# Patient Record
Sex: FEMALE | Race: ASIAN | NOT HISPANIC OR LATINO | Employment: FULL TIME
[De-identification: names, ages, dates, MRNs, and addresses within clinical notes are randomized per-mention and may not be internally consistent; named-entity substitution may affect disease eponyms.]

---

## 2018-02-04 ENCOUNTER — HEALTH MAINTENANCE LETTER (OUTPATIENT)
Age: 38
End: 2018-02-04

## 2019-11-08 ENCOUNTER — HEALTH MAINTENANCE LETTER (OUTPATIENT)
Age: 39
End: 2019-11-08

## 2020-02-23 ENCOUNTER — HEALTH MAINTENANCE LETTER (OUTPATIENT)
Age: 40
End: 2020-02-23

## 2020-12-06 ENCOUNTER — HEALTH MAINTENANCE LETTER (OUTPATIENT)
Age: 40
End: 2020-12-06

## 2021-01-22 ENCOUNTER — TRANSFERRED RECORDS (OUTPATIENT)
Dept: HEALTH INFORMATION MANAGEMENT | Facility: CLINIC | Age: 41
End: 2021-01-22
Payer: COMMERCIAL

## 2021-01-22 LAB
HPV ABSTRACT: NORMAL
PAP-ABSTRACT: NORMAL

## 2021-07-21 ENCOUNTER — ANCILLARY PROCEDURE (OUTPATIENT)
Dept: MAMMOGRAPHY | Facility: CLINIC | Age: 41
End: 2021-07-21
Payer: COMMERCIAL

## 2021-07-21 DIAGNOSIS — Z12.31 VISIT FOR SCREENING MAMMOGRAM: ICD-10-CM

## 2021-07-21 PROCEDURE — 77063 BREAST TOMOSYNTHESIS BI: CPT | Mod: TC | Performed by: RADIOLOGY

## 2021-07-21 PROCEDURE — 77067 SCR MAMMO BI INCL CAD: CPT | Mod: TC | Performed by: RADIOLOGY

## 2021-09-25 ENCOUNTER — HEALTH MAINTENANCE LETTER (OUTPATIENT)
Age: 41
End: 2021-09-25

## 2021-10-28 DIAGNOSIS — Z11.59 ENCOUNTER FOR SCREENING FOR OTHER VIRAL DISEASES: ICD-10-CM

## 2021-11-05 ENCOUNTER — MEDICAL CORRESPONDENCE (OUTPATIENT)
Dept: HEALTH INFORMATION MANAGEMENT | Facility: CLINIC | Age: 41
End: 2021-11-05
Payer: COMMERCIAL

## 2021-12-07 ENCOUNTER — LAB (OUTPATIENT)
Dept: URGENT CARE | Facility: URGENT CARE | Age: 41
End: 2021-12-07
Attending: OBSTETRICS & GYNECOLOGY
Payer: COMMERCIAL

## 2021-12-07 DIAGNOSIS — Z11.59 ENCOUNTER FOR SCREENING FOR OTHER VIRAL DISEASES: ICD-10-CM

## 2021-12-07 LAB — SARS-COV-2 RNA RESP QL NAA+PROBE: POSITIVE

## 2021-12-07 PROCEDURE — U0003 INFECTIOUS AGENT DETECTION BY NUCLEIC ACID (DNA OR RNA); SEVERE ACUTE RESPIRATORY SYNDROME CORONAVIRUS 2 (SARS-COV-2) (CORONAVIRUS DISEASE [COVID-19]), AMPLIFIED PROBE TECHNIQUE, MAKING USE OF HIGH THROUGHPUT TECHNOLOGIES AS DESCRIBED BY CMS-2020-01-R: HCPCS

## 2021-12-07 PROCEDURE — U0005 INFEC AGEN DETEC AMPLI PROBE: HCPCS

## 2021-12-08 ENCOUNTER — TELEPHONE (OUTPATIENT)
Dept: URGENT CARE | Facility: URGENT CARE | Age: 41
End: 2021-12-08
Payer: COMMERCIAL

## 2021-12-08 NOTE — TELEPHONE ENCOUNTER
"PRE-SURGERY      Coronavirus (COVID-19) Notification    Caller Name (Patient, parent, daughter/son, grandparent, etc)  patient    Reason for call  Notify of Positive Coronavirus (COVID-19) lab results, assess symptoms,  review [facility] recommendations    Lab Result    Lab test:  2019-nCoV rRt-PCR or SARS-CoV-2 PCR    Oropharyngeal AND/OR nasopharyngeal swabs is POSITIVE for 2019-nCoV RNA/SARS-COV-2 PCR (COVID-19 virus)    RN Recommendations/Instructions per [facility] Coronavirus COVID-19 recommendations    Brief introduction script  Introduce self then review script:  \"I am calling on behalf of [employer].  We were notified that your Coronavirus test (COVID-19) for was POSITIVE for the virus.  I have some information to relay to you but first I wanted to mention that the [place] Dept of Health will be contacting you shortly [it's possible MD already called Patient] to talk to you more about how you are feeling and other people you have had contact with who might now also have the virus.  Also, [facility] is Partnering with the [facility] for Covid-19 research, you may be contacted directly by research staff.\"    Patient reports she is fully vaccinated for Covid with Pfizer including the Booster.    Assessment (Inquire about Patient's current symptoms)   Assessment   Current Symptoms at time of phone call: (if no symptoms, document No symptoms] Nasal congestion, runny nose   Symptoms onset (if applicable) 12/4/2021     If at time of call, Patients symptoms hare worsened, the Patient should contact 911 or have someone drive them to Emergency Dept promptly:      If Patient calling 911, inform 911 personal that you have tested positive for the Coronavirus (COVID-19).  Place mask on and await 911 to arrive.    If Emergency Dept, If possible, please have another adult drive you to the Emergency Dept but you need to wear mask when in contact with other people.      Monoclonal Antibody " "Administration    You may be eligible to receive a new treatment with a monoclonal antibody for preventing hospitalization in patients at high risk for complications from COVID-19.   This medication is still experimental and available on a limited basis; it is given through an IV and must be given at an infusion center. Please note that not all people who are eligible will receive the medication since it is in limited supply.     Are you interested in being considered for this medication?  No.   Does the patient fit the criteria: Patient declined?      If patient qualifies based on above criteria:  \"You will be contacted if you are selected to receive this treatment in the next 1-2 business days.   This is time sensitive and if you are not selected in the next 1-2 business days, you will not receive the medication.  If you do not receive a call to schedule, you have not been selected.\"      Review information with Patient    Your result was positive. This means you have COVID-19 (coronavirus).  We have sent you a letter that reviews the information that I'll be reviewing with you now.    How can I protect others?    If you have symptoms: stay home and away from others (self-isolate) until:    You've had no fever--and no medicine that reduces fever--for 1 full day (24 hours). And       Your other symptoms have gotten better. For example, your cough or breathing has improved. And     At least 10 days have passed since your symptoms started. (If you've been told by a doctor that you have a weak immune system, wait 20 days.)     If you don't have symptoms: Stay home and away from others (self-isolate) until at least 10 days have passed since your first positive COVID-19 test. (Date test collected)    During this time:    Stay in your own room, including for meals. Use your own bathroom if you can.    Stay away from others in your home. No hugging, kissing or shaking hands. No visitors.     Don't go to work, school or " anywhere else.     Clean  high touch  surfaces often (doorknobs, counters, handles, etc.). Use a household cleaning spray or wipes. You'll find a full list on the EPA website at www.epa.gov/pesticide-registration/list-n-disinfectants-use-against-sars-cov-2.     Cover your mouth and nose with a mask, tissue or other face covering to avoid spreading germs.    Wash your hands and face often with soap and water.    Make a list of people you have been in close contact with recently, even if either of you wore a face covering.   ; Start your list from 2 days before you became ill or had a positive test.  ; Include anyone that was within 6 feet of you for a cumulative total of 15 minutes or more in 24 hours. (Example: if you sat next to Bryn for 5 minutes in the morning and 10 minutes in the afternoon, then you were in close contact for 15 minutes total that day. Bryn would be added to your list.)    A public health worker will call or text you. It is important that you answer. They will ask you questions about possible exposures to COVID-19, such as people you have been in direct contact with and places you have visited.    Tell the people on your list that you have COVID-19; they should stay away from others for 14 days starting from the last time they were in contact with you (unless you are told something different from a public health worker).     Caregivers in these groups are at risk for severe illness due to COVID-19:  o People 65 years and older  o People who live in a nursing home or long-term care facility  o People with chronic disease (lung, heart, cancer, diabetes, kidney, liver, immunologic)  o People who have a weakened immune system, including those who:  - Are in cancer treatment  - Take medicine that weakens the immune system, such as corticosteroids  - Had a bone marrow or organ transplant  - Have an immune deficiency  - Have poorly controlled HIV or AIDS  - Are obese (body mass index of 40 or  higher)  - Smoke regularly    Caregivers should wear gloves while washing dishes, handling laundry and cleaning bedrooms and bathrooms.    Wash and dry laundry with special caution. Don't shake dirty laundry, and use the warmest water setting you can.    If you have a weakened immune system, ask your doctor about other actions you should take.    For more tips, go to www.cdc.gov/coronavirus/2019-ncov/downloads/10Things.pdf.    You should not go back to work until you meet the guidelines above for ending your home isolation. You don't need to be retested for COVID-19 before going back to work--studies show that you won't spread the virus if it's been at least 10 days since your symptoms started (or 20 days, if you have a weak immune system).    Employers: This document serves as formal notice of your employee's medical guidelines for going back to work. They must meet the above guidelines before going back to work in person.    How can I take care of myself?    1. Get lots of rest. Drink extra fluids (unless a doctor has told you not to).    2. Take Tylenol (acetaminophen) for fever or pain. If you have liver or kidney problems, ask your family doctor if it's okay to take Tylenol.     Take either:     650 mg (two 325 mg pills) every 4 to 6 hours, or     1,000 mg (two 500 mg pills) every 8 hours as needed.     Note: Don't take more than 3,000 mg in one day. Acetaminophen is found in many medicines (both prescribed and over-the-counter medicines). Read all labels to be sure you don't take too much.    For children, check the Tylenol bottle for the right dose (based on their age or weight).    3. If you have other health problems (like cancer, heart failure, an organ transplant or severe kidney disease): Call your specialty clinic if you don't feel better in the next 2 days.    4. Know when to call 911: Emergency warning signs include:    Trouble breathing or shortness of breath    Pain or pressure in the chest that  doesn't go away    Feeling confused like you haven't felt before, or not being able to wake up    Bluish-colored lips or face    5. Sign up for [employer]. We know it's scary to hear that you have COVID-19. We want to track your symptoms to make sure you're okay over the next 2 weeks. Please look for an email from [employer]--this is a free, online program that we'll use to keep in touch. To sign up, follow the link in the email. Learn more at [email or web address].    Where can I get more information?    [facility]: [email or web address]    Coronavirus Basics: www.health.[facility].[place]/diseases/coronavirus/basics.html    What to Do If You're Sick: www.cdc.gov/coronavirus/2019-ncov/about/steps-when-sick.html    Ending Home Isolation: www.cdc.gov/coronavirus/2019-ncov/hcp/disposition-in-home-patients.html     Caring for Someone with COVID-19: www.cdc.gov/coronavirus/2019-ncov/if-you-are-sick/care-for-someone.html     [facility] clinical trials (COVID-19 research studies): [email or web address]     A Positive COVID-19 letter will be sent via Stripe or the mail. (Exception, no letters sent to Presurgerical/Preprocedure Patients)    Anisa Cardenas LPN

## 2022-01-05 RX ORDER — ACETAMINOPHEN 325 MG/1
325-650 TABLET ORAL DAILY PRN
COMMUNITY
End: 2022-06-06

## 2022-01-05 RX ORDER — FERROUS SULFATE 325(65) MG
325 TABLET ORAL
Status: ON HOLD | COMMUNITY
End: 2022-07-14

## 2022-01-05 RX ORDER — FLUTICASONE PROPIONATE 50 MCG
1 SPRAY, SUSPENSION (ML) NASAL DAILY PRN
COMMUNITY
End: 2022-06-06

## 2022-01-05 RX ORDER — ZINC OXIDE 13 %
1 CREAM (GRAM) TOPICAL EVERY EVENING
COMMUNITY
End: 2022-06-06

## 2022-01-05 NOTE — PROGRESS NOTES
PTA medications updated by Medication Scribe prior to surgery via phone call with patient (last doses completed by Nurse)     Medication history sources: Patient, Surescripts and H&P  In the past week, patient estimated taking medication this percent of the time: Greater than 90%  Adherence assessment: N/A Not Observed    Significant changes made to the medication list:  None      Additional medication history information:   None    Medication reconciliation completed by provider prior to medication history? No    Time spent in this activity: 25 MINUTES    The information provided in this note is only as accurate as the sources available at the time of update(s)      Prior to Admission medications    Medication Sig Last Dose Taking? Auth Provider   acetaminophen (TYLENOL) 325 MG tablet Take 325-650 mg by mouth daily as needed for mild pain  at PRN Yes Reported, Patient   ferrous sulfate (FEROSUL) 325 (65 Fe) MG tablet Take 325 mg by mouth daily (with breakfast)  at AM Yes Reported, Patient   fluticasone (FLONASE) 50 MCG/ACT nasal spray Spray 1 spray into both nostrils daily as needed for rhinitis or allergies  at PRN Yes Reported, Patient   leuprolide (LUPRON DEPOT) 3.75 MG kit Inject 3.75 mg into the muscle every 3 months  at PRN Yes Reported, Patient   Multiple Vitamin (MULTI-VITAMIN) per tablet Take 1 tablet by mouth daily.  at PM Yes Livia Montiel MD   Probiotic Product (PROBIOTIC DAILY) CAPS Take 1 capsule by mouth every evening  at PM Yes Reported, Patient

## 2022-01-06 ENCOUNTER — HOSPITAL ENCOUNTER (INPATIENT)
Facility: CLINIC | Age: 42
LOS: 1 days | Discharge: HOME | DRG: 750 | End: 2022-01-07
Attending: OBSTETRICS & GYNECOLOGY | Admitting: OBSTETRICS & GYNECOLOGY
Payer: COMMERCIAL

## 2022-01-06 ENCOUNTER — ANESTHESIA (OUTPATIENT)
Dept: SURGERY | Facility: CLINIC | Age: 42
DRG: 750 | End: 2022-01-06
Payer: COMMERCIAL

## 2022-01-06 ENCOUNTER — ANESTHESIA EVENT (OUTPATIENT)
Dept: SURGERY | Facility: CLINIC | Age: 42
DRG: 750 | End: 2022-01-06
Payer: COMMERCIAL

## 2022-01-06 DIAGNOSIS — Z98.890 S/P LAPAROTOMY: Primary | ICD-10-CM

## 2022-01-06 LAB
B-HCG SERPL-ACNC: <1 IU/L (ref 0–5)
HGB BLD-MCNC: 13.1 G/DL (ref 11.7–15.7)

## 2022-01-06 PROCEDURE — 258N000003 HC RX IP 258 OP 636: Performed by: NURSE ANESTHETIST, CERTIFIED REGISTERED

## 2022-01-06 PROCEDURE — 250N000011 HC RX IP 250 OP 636: Performed by: ANESTHESIOLOGY

## 2022-01-06 PROCEDURE — 272N000001 HC OR GENERAL SUPPLY STERILE: Performed by: OBSTETRICS & GYNECOLOGY

## 2022-01-06 PROCEDURE — 250N000011 HC RX IP 250 OP 636: Performed by: PHYSICIAN ASSISTANT

## 2022-01-06 PROCEDURE — 120N000001 HC R&B MED SURG/OB

## 2022-01-06 PROCEDURE — 250N000013 HC RX MED GY IP 250 OP 250 PS 637: Performed by: PHYSICIAN ASSISTANT

## 2022-01-06 PROCEDURE — 250N000011 HC RX IP 250 OP 636: Performed by: NURSE ANESTHETIST, CERTIFIED REGISTERED

## 2022-01-06 PROCEDURE — 250N000009 HC RX 250: Performed by: NURSE ANESTHETIST, CERTIFIED REGISTERED

## 2022-01-06 PROCEDURE — 250N000011 HC RX IP 250 OP 636

## 2022-01-06 PROCEDURE — 710N000009 HC RECOVERY PHASE 1, LEVEL 1, PER MIN: Performed by: OBSTETRICS & GYNECOLOGY

## 2022-01-06 PROCEDURE — 360N000076 HC SURGERY LEVEL 3, PER MIN: Performed by: OBSTETRICS & GYNECOLOGY

## 2022-01-06 PROCEDURE — 258N000003 HC RX IP 258 OP 636

## 2022-01-06 PROCEDURE — 85018 HEMOGLOBIN: CPT | Performed by: PHYSICIAN ASSISTANT

## 2022-01-06 PROCEDURE — 258N000003 HC RX IP 258 OP 636: Performed by: ANESTHESIOLOGY

## 2022-01-06 PROCEDURE — 258N000003 HC RX IP 258 OP 636: Performed by: OBSTETRICS & GYNECOLOGY

## 2022-01-06 PROCEDURE — 999N000141 HC STATISTIC PRE-PROCEDURE NURSING ASSESSMENT: Performed by: OBSTETRICS & GYNECOLOGY

## 2022-01-06 PROCEDURE — C1765 ADHESION BARRIER: HCPCS | Performed by: OBSTETRICS & GYNECOLOGY

## 2022-01-06 PROCEDURE — 370N000017 HC ANESTHESIA TECHNICAL FEE, PER MIN: Performed by: OBSTETRICS & GYNECOLOGY

## 2022-01-06 PROCEDURE — 84702 CHORIONIC GONADOTROPIN TEST: CPT | Performed by: PHYSICIAN ASSISTANT

## 2022-01-06 PROCEDURE — 0WJG0ZZ INSPECTION OF PERITONEAL CAVITY, OPEN APPROACH: ICD-10-PCS | Performed by: OBSTETRICS & GYNECOLOGY

## 2022-01-06 PROCEDURE — 250N000025 HC SEVOFLURANE, PER MIN: Performed by: OBSTETRICS & GYNECOLOGY

## 2022-01-06 PROCEDURE — 250N000013 HC RX MED GY IP 250 OP 250 PS 637: Performed by: OBSTETRICS & GYNECOLOGY

## 2022-01-06 PROCEDURE — 36415 COLL VENOUS BLD VENIPUNCTURE: CPT | Performed by: PHYSICIAN ASSISTANT

## 2022-01-06 PROCEDURE — 250N000009 HC RX 250

## 2022-01-06 PROCEDURE — 250N000011 HC RX IP 250 OP 636: Performed by: OBSTETRICS & GYNECOLOGY

## 2022-01-06 PROCEDURE — 250N000009 HC RX 250: Performed by: OBSTETRICS & GYNECOLOGY

## 2022-01-06 RX ORDER — HYDROMORPHONE HCL IN WATER/PF 6 MG/30 ML
0.4 PATIENT CONTROLLED ANALGESIA SYRINGE INTRAVENOUS
Status: DISCONTINUED | OUTPATIENT
Start: 2022-01-06 | End: 2022-01-07 | Stop reason: HOSPADM

## 2022-01-06 RX ORDER — BISACODYL 10 MG
10 SUPPOSITORY, RECTAL RECTAL DAILY PRN
Status: DISCONTINUED | OUTPATIENT
Start: 2022-01-06 | End: 2022-01-07 | Stop reason: HOSPADM

## 2022-01-06 RX ORDER — NALOXONE HYDROCHLORIDE 0.4 MG/ML
0.2 INJECTION, SOLUTION INTRAMUSCULAR; INTRAVENOUS; SUBCUTANEOUS
Status: DISCONTINUED | OUTPATIENT
Start: 2022-01-06 | End: 2022-01-07 | Stop reason: HOSPADM

## 2022-01-06 RX ORDER — NALOXONE HYDROCHLORIDE 0.4 MG/ML
0.4 INJECTION, SOLUTION INTRAMUSCULAR; INTRAVENOUS; SUBCUTANEOUS
Status: DISCONTINUED | OUTPATIENT
Start: 2022-01-06 | End: 2022-01-07 | Stop reason: HOSPADM

## 2022-01-06 RX ORDER — DIMENHYDRINATE 50 MG/ML
25 INJECTION, SOLUTION INTRAMUSCULAR; INTRAVENOUS
Status: DISCONTINUED | OUTPATIENT
Start: 2022-01-06 | End: 2022-01-06 | Stop reason: HOSPADM

## 2022-01-06 RX ORDER — PROPOFOL 10 MG/ML
INJECTION, EMULSION INTRAVENOUS PRN
Status: DISCONTINUED | OUTPATIENT
Start: 2022-01-06 | End: 2022-01-06

## 2022-01-06 RX ORDER — ONDANSETRON 4 MG/1
4 TABLET, ORALLY DISINTEGRATING ORAL EVERY 30 MIN PRN
Status: DISCONTINUED | OUTPATIENT
Start: 2022-01-06 | End: 2022-01-06 | Stop reason: HOSPADM

## 2022-01-06 RX ORDER — NEOSTIGMINE METHYLSULFATE 1 MG/ML
VIAL (ML) INJECTION PRN
Status: DISCONTINUED | OUTPATIENT
Start: 2022-01-06 | End: 2022-01-06

## 2022-01-06 RX ORDER — SODIUM CHLORIDE, SODIUM LACTATE, POTASSIUM CHLORIDE, CALCIUM CHLORIDE 600; 310; 30; 20 MG/100ML; MG/100ML; MG/100ML; MG/100ML
INJECTION, SOLUTION INTRAVENOUS CONTINUOUS
Status: DISCONTINUED | OUTPATIENT
Start: 2022-01-06 | End: 2022-01-06 | Stop reason: HOSPADM

## 2022-01-06 RX ORDER — HYDRALAZINE HYDROCHLORIDE 20 MG/ML
2.5-5 INJECTION INTRAMUSCULAR; INTRAVENOUS EVERY 10 MIN PRN
Status: DISCONTINUED | OUTPATIENT
Start: 2022-01-06 | End: 2022-01-06 | Stop reason: HOSPADM

## 2022-01-06 RX ORDER — OXYCODONE HYDROCHLORIDE 5 MG/1
5 TABLET ORAL EVERY 4 HOURS PRN
Status: DISCONTINUED | OUTPATIENT
Start: 2022-01-06 | End: 2022-01-06 | Stop reason: HOSPADM

## 2022-01-06 RX ORDER — FAMOTIDINE 20 MG/1
20 TABLET, FILM COATED ORAL 2 TIMES DAILY
Status: DISCONTINUED | OUTPATIENT
Start: 2022-01-06 | End: 2022-01-07 | Stop reason: HOSPADM

## 2022-01-06 RX ORDER — CALCIUM CARBONATE 500 MG/1
500 TABLET, CHEWABLE ORAL 4 TIMES DAILY PRN
Status: DISCONTINUED | OUTPATIENT
Start: 2022-01-06 | End: 2022-01-07 | Stop reason: HOSPADM

## 2022-01-06 RX ORDER — OXYCODONE HYDROCHLORIDE 5 MG/1
5 TABLET ORAL EVERY 4 HOURS PRN
Status: DISCONTINUED | OUTPATIENT
Start: 2022-01-06 | End: 2022-01-07 | Stop reason: HOSPADM

## 2022-01-06 RX ORDER — ONDANSETRON 2 MG/ML
4 INJECTION INTRAMUSCULAR; INTRAVENOUS EVERY 6 HOURS PRN
Status: DISCONTINUED | OUTPATIENT
Start: 2022-01-06 | End: 2022-01-07 | Stop reason: HOSPADM

## 2022-01-06 RX ORDER — LIDOCAINE 40 MG/G
CREAM TOPICAL
Status: DISCONTINUED | OUTPATIENT
Start: 2022-01-06 | End: 2022-01-07 | Stop reason: HOSPADM

## 2022-01-06 RX ORDER — ACETAMINOPHEN 325 MG/1
975 TABLET ORAL ONCE
Status: DISCONTINUED | OUTPATIENT
Start: 2022-01-06 | End: 2022-01-06 | Stop reason: HOSPADM

## 2022-01-06 RX ORDER — ACETAMINOPHEN 325 MG/1
975 TABLET ORAL EVERY 6 HOURS
Status: DISCONTINUED | OUTPATIENT
Start: 2022-01-06 | End: 2022-01-07 | Stop reason: HOSPADM

## 2022-01-06 RX ORDER — HYDROXYZINE HYDROCHLORIDE 25 MG/1
25 TABLET, FILM COATED ORAL EVERY 6 HOURS PRN
Status: DISCONTINUED | OUTPATIENT
Start: 2022-01-06 | End: 2022-01-07 | Stop reason: HOSPADM

## 2022-01-06 RX ORDER — AMOXICILLIN 250 MG
1 CAPSULE ORAL 2 TIMES DAILY
Status: DISCONTINUED | OUTPATIENT
Start: 2022-01-06 | End: 2022-01-07 | Stop reason: HOSPADM

## 2022-01-06 RX ORDER — CEFAZOLIN SODIUM 2 G/100ML
2 INJECTION, SOLUTION INTRAVENOUS
Status: COMPLETED | OUTPATIENT
Start: 2022-01-06 | End: 2022-01-06

## 2022-01-06 RX ORDER — ACETAMINOPHEN 325 MG/1
650 TABLET ORAL EVERY 6 HOURS
Status: DISCONTINUED | OUTPATIENT
Start: 2022-01-09 | End: 2022-01-07 | Stop reason: HOSPADM

## 2022-01-06 RX ORDER — FENTANYL CITRATE 50 UG/ML
INJECTION, SOLUTION INTRAMUSCULAR; INTRAVENOUS PRN
Status: DISCONTINUED | OUTPATIENT
Start: 2022-01-06 | End: 2022-01-06

## 2022-01-06 RX ORDER — ONDANSETRON 2 MG/ML
INJECTION INTRAMUSCULAR; INTRAVENOUS PRN
Status: DISCONTINUED | OUTPATIENT
Start: 2022-01-06 | End: 2022-01-06

## 2022-01-06 RX ORDER — SODIUM CHLORIDE, SODIUM LACTATE, POTASSIUM CHLORIDE, CALCIUM CHLORIDE 600; 310; 30; 20 MG/100ML; MG/100ML; MG/100ML; MG/100ML
INJECTION, SOLUTION INTRAVENOUS CONTINUOUS
Status: DISCONTINUED | OUTPATIENT
Start: 2022-01-06 | End: 2022-01-07 | Stop reason: HOSPADM

## 2022-01-06 RX ORDER — PROPOFOL 10 MG/ML
INJECTION, EMULSION INTRAVENOUS CONTINUOUS PRN
Status: DISCONTINUED | OUTPATIENT
Start: 2022-01-06 | End: 2022-01-06

## 2022-01-06 RX ORDER — KETOROLAC TROMETHAMINE 15 MG/ML
15 INJECTION, SOLUTION INTRAMUSCULAR; INTRAVENOUS EVERY 6 HOURS
Status: DISCONTINUED | OUTPATIENT
Start: 2022-01-06 | End: 2022-01-07 | Stop reason: HOSPADM

## 2022-01-06 RX ORDER — HYDROMORPHONE HCL IN WATER/PF 6 MG/30 ML
0.2 PATIENT CONTROLLED ANALGESIA SYRINGE INTRAVENOUS EVERY 5 MIN PRN
Status: DISCONTINUED | OUTPATIENT
Start: 2022-01-06 | End: 2022-01-06 | Stop reason: HOSPADM

## 2022-01-06 RX ORDER — ONDANSETRON 4 MG/1
4 TABLET, ORALLY DISINTEGRATING ORAL EVERY 6 HOURS PRN
Status: DISCONTINUED | OUTPATIENT
Start: 2022-01-06 | End: 2022-01-07 | Stop reason: HOSPADM

## 2022-01-06 RX ORDER — PROCHLORPERAZINE MALEATE 10 MG
10 TABLET ORAL EVERY 6 HOURS PRN
Status: DISCONTINUED | OUTPATIENT
Start: 2022-01-06 | End: 2022-01-07 | Stop reason: HOSPADM

## 2022-01-06 RX ORDER — KETOROLAC TROMETHAMINE 30 MG/ML
INJECTION, SOLUTION INTRAMUSCULAR; INTRAVENOUS PRN
Status: DISCONTINUED | OUTPATIENT
Start: 2022-01-06 | End: 2022-01-06

## 2022-01-06 RX ORDER — OXYCODONE HYDROCHLORIDE 5 MG/1
10 TABLET ORAL EVERY 4 HOURS PRN
Status: DISCONTINUED | OUTPATIENT
Start: 2022-01-06 | End: 2022-01-07 | Stop reason: HOSPADM

## 2022-01-06 RX ORDER — ACETAMINOPHEN 325 MG/1
975 TABLET ORAL ONCE
Status: COMPLETED | OUTPATIENT
Start: 2022-01-06 | End: 2022-01-06

## 2022-01-06 RX ORDER — HYDROXYZINE HYDROCHLORIDE 25 MG/1
25 TABLET, FILM COATED ORAL EVERY 6 HOURS PRN
Status: DISCONTINUED | OUTPATIENT
Start: 2022-01-06 | End: 2022-01-06 | Stop reason: HOSPADM

## 2022-01-06 RX ORDER — HYDROMORPHONE HCL IN WATER/PF 6 MG/30 ML
0.2 PATIENT CONTROLLED ANALGESIA SYRINGE INTRAVENOUS
Status: DISCONTINUED | OUTPATIENT
Start: 2022-01-06 | End: 2022-01-07 | Stop reason: HOSPADM

## 2022-01-06 RX ORDER — CEFAZOLIN SODIUM 2 G/100ML
2 INJECTION, SOLUTION INTRAVENOUS SEE ADMIN INSTRUCTIONS
Status: DISCONTINUED | OUTPATIENT
Start: 2022-01-06 | End: 2022-01-06 | Stop reason: HOSPADM

## 2022-01-06 RX ORDER — LABETALOL HYDROCHLORIDE 5 MG/ML
10 INJECTION, SOLUTION INTRAVENOUS
Status: DISCONTINUED | OUTPATIENT
Start: 2022-01-06 | End: 2022-01-06 | Stop reason: HOSPADM

## 2022-01-06 RX ORDER — IBUPROFEN 400 MG/1
800 TABLET, FILM COATED ORAL EVERY 6 HOURS
Status: DISCONTINUED | OUTPATIENT
Start: 2022-01-06 | End: 2022-01-07 | Stop reason: HOSPADM

## 2022-01-06 RX ORDER — GLYCOPYRROLATE 0.2 MG/ML
INJECTION, SOLUTION INTRAMUSCULAR; INTRAVENOUS PRN
Status: DISCONTINUED | OUTPATIENT
Start: 2022-01-06 | End: 2022-01-06

## 2022-01-06 RX ORDER — FENTANYL CITRATE 0.05 MG/ML
50 INJECTION, SOLUTION INTRAMUSCULAR; INTRAVENOUS EVERY 5 MIN PRN
Status: DISCONTINUED | OUTPATIENT
Start: 2022-01-06 | End: 2022-01-06 | Stop reason: HOSPADM

## 2022-01-06 RX ORDER — DEXAMETHASONE SODIUM PHOSPHATE 4 MG/ML
INJECTION, SOLUTION INTRA-ARTICULAR; INTRALESIONAL; INTRAMUSCULAR; INTRAVENOUS; SOFT TISSUE PRN
Status: DISCONTINUED | OUTPATIENT
Start: 2022-01-06 | End: 2022-01-06

## 2022-01-06 RX ORDER — LIDOCAINE HYDROCHLORIDE 20 MG/ML
INJECTION, SOLUTION INFILTRATION; PERINEURAL PRN
Status: DISCONTINUED | OUTPATIENT
Start: 2022-01-06 | End: 2022-01-06

## 2022-01-06 RX ORDER — ONDANSETRON 2 MG/ML
4 INJECTION INTRAMUSCULAR; INTRAVENOUS EVERY 30 MIN PRN
Status: DISCONTINUED | OUTPATIENT
Start: 2022-01-06 | End: 2022-01-06 | Stop reason: HOSPADM

## 2022-01-06 RX ORDER — MAGNESIUM HYDROXIDE 1200 MG/15ML
LIQUID ORAL PRN
Status: DISCONTINUED | OUTPATIENT
Start: 2022-01-06 | End: 2022-01-06 | Stop reason: HOSPADM

## 2022-01-06 RX ADMIN — DEXAMETHASONE SODIUM PHOSPHATE 4 MG: 4 INJECTION, SOLUTION INTRA-ARTICULAR; INTRALESIONAL; INTRAMUSCULAR; INTRAVENOUS; SOFT TISSUE at 13:50

## 2022-01-06 RX ADMIN — FAMOTIDINE 20 MG: 20 TABLET ORAL at 21:44

## 2022-01-06 RX ADMIN — GLYCOPYRROLATE 0.4 MG: 0.2 INJECTION, SOLUTION INTRAMUSCULAR; INTRAVENOUS at 15:06

## 2022-01-06 RX ADMIN — FENTANYL CITRATE 50 MCG: 50 INJECTION, SOLUTION INTRAMUSCULAR; INTRAVENOUS at 15:52

## 2022-01-06 RX ADMIN — PHENYLEPHRINE HYDROCHLORIDE 100 MCG: 10 INJECTION INTRAVENOUS at 15:14

## 2022-01-06 RX ADMIN — FENTANYL CITRATE 50 MCG: 50 INJECTION, SOLUTION INTRAMUSCULAR; INTRAVENOUS at 16:04

## 2022-01-06 RX ADMIN — PROPOFOL 200 MG: 10 INJECTION, EMULSION INTRAVENOUS at 13:50

## 2022-01-06 RX ADMIN — PHENYLEPHRINE HYDROCHLORIDE 100 MCG: 10 INJECTION INTRAVENOUS at 14:20

## 2022-01-06 RX ADMIN — HYDROMORPHONE HYDROCHLORIDE 0.4 MG: 0.2 INJECTION, SOLUTION INTRAMUSCULAR; INTRAVENOUS; SUBCUTANEOUS at 22:48

## 2022-01-06 RX ADMIN — FENTANYL CITRATE 50 MCG: 50 INJECTION, SOLUTION INTRAMUSCULAR; INTRAVENOUS at 13:50

## 2022-01-06 RX ADMIN — IBUPROFEN 800 MG: 400 TABLET, FILM COATED ORAL at 21:44

## 2022-01-06 RX ADMIN — PHENYLEPHRINE HYDROCHLORIDE 150 MCG: 10 INJECTION INTRAVENOUS at 14:49

## 2022-01-06 RX ADMIN — FENTANYL CITRATE 50 MCG: 50 INJECTION, SOLUTION INTRAMUSCULAR; INTRAVENOUS at 16:45

## 2022-01-06 RX ADMIN — MIDAZOLAM 2 MG: 1 INJECTION INTRAMUSCULAR; INTRAVENOUS at 13:45

## 2022-01-06 RX ADMIN — ACETAMINOPHEN 975 MG: 325 TABLET, FILM COATED ORAL at 18:14

## 2022-01-06 RX ADMIN — ACETAMINOPHEN 975 MG: 325 TABLET, FILM COATED ORAL at 11:40

## 2022-01-06 RX ADMIN — FENTANYL CITRATE 50 MCG: 50 INJECTION, SOLUTION INTRAMUSCULAR; INTRAVENOUS at 14:14

## 2022-01-06 RX ADMIN — HYDROMORPHONE HYDROCHLORIDE 0.4 MG: 0.2 INJECTION, SOLUTION INTRAMUSCULAR; INTRAVENOUS; SUBCUTANEOUS at 19:34

## 2022-01-06 RX ADMIN — ONDANSETRON 4 MG: 2 INJECTION INTRAMUSCULAR; INTRAVENOUS at 15:03

## 2022-01-06 RX ADMIN — DEXMEDETOMIDINE HYDROCHLORIDE 12 MCG: 100 INJECTION, SOLUTION INTRAVENOUS at 14:02

## 2022-01-06 RX ADMIN — NEOSTIGMINE METHYLSULFATE 3 MG: 1 INJECTION, SOLUTION INTRAVENOUS at 15:06

## 2022-01-06 RX ADMIN — KETOROLAC TROMETHAMINE 30 MG: 30 INJECTION, SOLUTION INTRAMUSCULAR at 15:05

## 2022-01-06 RX ADMIN — SODIUM CHLORIDE, POTASSIUM CHLORIDE, SODIUM LACTATE AND CALCIUM CHLORIDE: 600; 310; 30; 20 INJECTION, SOLUTION INTRAVENOUS at 11:40

## 2022-01-06 RX ADMIN — LIDOCAINE HYDROCHLORIDE 100 MG: 20 INJECTION, SOLUTION INFILTRATION; PERINEURAL at 13:50

## 2022-01-06 RX ADMIN — ROCURONIUM BROMIDE 50 MG: 50 INJECTION, SOLUTION INTRAVENOUS at 13:50

## 2022-01-06 RX ADMIN — PHENYLEPHRINE HYDROCHLORIDE 150 MCG: 10 INJECTION INTRAVENOUS at 14:31

## 2022-01-06 RX ADMIN — PROPOFOL 30 MCG/KG/MIN: 10 INJECTION, EMULSION INTRAVENOUS at 13:50

## 2022-01-06 RX ADMIN — CEFAZOLIN SODIUM 2 G: 2 INJECTION, SOLUTION INTRAVENOUS at 13:45

## 2022-01-06 RX ADMIN — SODIUM CHLORIDE, POTASSIUM CHLORIDE, SODIUM LACTATE AND CALCIUM CHLORIDE: 600; 310; 30; 20 INJECTION, SOLUTION INTRAVENOUS at 17:46

## 2022-01-06 RX ADMIN — SENNOSIDES AND DOCUSATE SODIUM 1 TABLET: 50; 8.6 TABLET ORAL at 21:44

## 2022-01-06 ASSESSMENT — MIFFLIN-ST. JEOR: SCORE: 1304.99

## 2022-01-06 ASSESSMENT — LIFESTYLE VARIABLES: TOBACCO_USE: 0

## 2022-01-06 ASSESSMENT — ACTIVITIES OF DAILY LIVING (ADL)
ADLS_ACUITY_SCORE: 3
ADLS_ACUITY_SCORE: 3
ADLS_ACUITY_SCORE: 7
ADLS_ACUITY_SCORE: 3
ADLS_ACUITY_SCORE: 10
ADLS_ACUITY_SCORE: 7
ADLS_ACUITY_SCORE: 3

## 2022-01-06 NOTE — ANESTHESIA CARE TRANSFER NOTE
Patient: Aidan Mays    Procedure: Procedure(s):  Laparotomy exploratory       Diagnosis: Intramural uterine fibroid [D25.1]  Diagnosis Additional Information: No value filed.    Anesthesia Type:   General     Note:    Oropharynx: oropharynx clear of all foreign objects and spontaneously breathing  Level of Consciousness: drowsy  Oxygen Supplementation: face mask  Level of Supplemental Oxygen (L/min / FiO2): 6  Independent Airway: airway patency satisfactory and stable  Dentition: dentition unchanged  Vital Signs Stable: post-procedure vital signs reviewed and stable  Report to RN Given: handoff report given  Patient transferred to: PACU  Comments: Neuromuscular blockade reversed after TOF 4/4, spontaneous respirations, adequate tidal volumes, followed commands to voice, oropharynx suctioned with soft flexible catheter, extubated atraumatically, extubated with suction, airway patent after extubation.  Oxygen via facemask at 6 liters per minute to PACU. Oxygen tubing connected to wall O2 in PACU, SpO2, NiBP, and EKG monitors and alarms on and functioning, Lauren Hugger warmer connected to patient gown, report on patient's clinical status given to PACU RN, RN questions answered.     Handoff Report: Identifed the Patient, Identified the Reponsible Provider, Reviewed the pertinent medical history, Discussed the surgical course, Reviewed Intra-OP anesthesia mangement and issues during anesthesia, Set expectations for post-procedure period and Allowed opportunity for questions and acknowledgement of understanding      Vitals:  Vitals Value Taken Time   /65 01/06/22 1540   Temp     Pulse 81 01/06/22 1541   Resp 12 01/06/22 1541   SpO2 100 % 01/06/22 1541   Vitals shown include unvalidated device data.    Electronically Signed By: MARK Gallo CRNA  January 6, 2022  3:42 PM

## 2022-01-06 NOTE — ANESTHESIA PREPROCEDURE EVALUATION
Anesthesia Pre-Procedure Evaluation    Patient: Aidan Mays   MRN: [id number] : 1980        Preoperative Diagnosis: Intramural uterine fibroid [D25.1]    Procedure : Procedure(s):  TOTAL ABDOMINAL HYSTERECTOMY, BILATERAL SALPINGECTOMY, POSSIBLE RIGHT OOPHORECTOMY,  RIGHT OVARIAN CYSTECTOMY, LYSIS OF ADHESIONS  CYSTOSCOPY          Past Medical History:   Diagnosis Date     Endometriosis      Pityriasis rosea 2016     Thyroid nodule 2/3/2011    left complex nodule      Past Surgical History:   Procedure Laterality Date     C/SECTION, LOW TRANSVERSE      twins     FINE NEEDLE ASPIRATION WITHOUT IMAGING GUIDANCE  2011    left thyroid nodule      No Known Allergies   Social History     Tobacco Use     Smoking status: Never Smoker     Smokeless tobacco: Never Used   Substance Use Topics     Alcohol use: No     Alcohol/week: 0.0 standard drinks      Wt Readings from Last 1 Encounters:   22 64.4 kg (142 lb)        Anesthesia Evaluation   Pt has had prior anesthetic. Type: General.    No history of anesthetic complications       ROS/MED HX  ENT/Pulmonary:    (-) tobacco use   Neurologic:       Cardiovascular:       METS/Exercise Tolerance:     Hematologic:       Musculoskeletal:       GI/Hepatic:       Renal/Genitourinary:       Endo: Comment: Endometriosis    (+) thyroid problem,  Thyroid disease - Other Nodule,     Psychiatric/Substance Use:       Infectious Disease: Comment: COVID positive as of 20      Malignancy:       Other:            Physical Exam    Airway        Mallampati: I   TM distance: > 3 FB   Neck ROM: full   Mouth opening: > 3 cm    Respiratory Devices and Support         Dental  no notable dental history         Cardiovascular          Rhythm and rate: regular and normal     Pulmonary   pulmonary exam normal                OUTSIDE LABS:  CBC:   Lab Results   Component Value Date    WBC 8.9 08/15/2011    WBC 7.7 2011    HGB 13.7 08/15/2011    HGB 12.3 2011    HCT  41.2 08/15/2011    HCT 36.4 02/03/2011     08/15/2011     02/03/2011     BMP: No results found for: NA, POTASSIUM, CHLORIDE, CO2, BUN, CR, GLC  COAGS:   Lab Results   Component Value Date    PTT 29 12/19/2010    INR 0.90 12/19/2010    FIBR 612 (H) 12/19/2010     POC:   Lab Results   Component Value Date    HCG Negative 08/20/2009     HEPATIC: No results found for: ALBUMIN, PROTTOTAL, ALT, AST, GGT, ALKPHOS, BILITOTAL, BILIDIRECT, DENIS  OTHER:   Lab Results   Component Value Date    TSH 1.02 02/03/2011       Anesthesia Plan    ASA Status:  2   NPO Status:  NPO Appropriate    Anesthesia Type: General.     - Airway: ETT   Induction: Intravenous.   Maintenance: Balanced.        Consents    Anesthesia Plan(s) and associated risks, benefits, and realistic alternatives discussed. Questions answered and patient/representative(s) expressed understanding.    - Discussed:     - Discussed with:  Patient         Postoperative Care    Pain management: IV analgesics, Oral pain medications, Multi-modal analgesia, Peripheral nerve block (Single Shot).   PONV prophylaxis: Ondansetron (or other 5HT-3), Dexamethasone or Solumedrol, Background Propofol Infusion     Comments:                Shantanu Torres MD

## 2022-01-06 NOTE — PLAN OF CARE
SANDEEP thomas for transfer of care to floor. Verified with Dr Garcia she is inpatient and he will see her in the am to discuss further plan. He discussed this at length with spouse. Pt AVSS on RA. Pain well controlled now, resting comfortably and arouses easily to voice.

## 2022-01-06 NOTE — OP NOTE
SURGEON: Scotty Garcia MD  ASSISTANT: Tosha Marlow was needed and instrumental for the case in order to provide retraction, visualization, and identification of abnormal anatomy.  INTRAOPERATIVE CONSULT: Dr. Any Sanchez, [facility]  PREOPERATIVE DIAGNOSIS: Abnormal uterine bleeding - Leiomyoma, Endometriosis, Chronic pelvic pain  POSTOPERATIVE DIAGNOSIS: Same, Severe pelvic adhesive disease  OPERATION PERFORMED: Exploratory laparotomy  ANESTHESIA: GETA and TAP block  EBL: 50 mL  FLUIDS: Lactated Ringers  URINE OUTPUT: clear urine in Perez catheter, removed at the end of the case  DRAINS: None  SPECIMENS: None  COMPLICATIONS: None    FINDINGS:   Anterior cul de sac obliterated with bladder adherent to the anterior uterus to the level of the round with filmy yet densely adherent and edematous tissue extending to the fundus, where it was identified the sigmoid colon was densely adherent to the fundus.  Left adnexa surgically absent with sigmoid colon diving under the uterus and unable to be mobilized nor the posterior cul de sac entered.  The posterior cul de sac was obliterated the the uterus unable to be lifted from the back wall of the pelvis.  The right adnexa was identified with a large fluid filled ovarian cyst that was peritonealized and non-distinct from the uterus.  The right adnexa was unable to be mobilized nor visualized.  The bulky fibroid uterus was crowding the lateral side walls with the vast majority of the broad ligament unable to be identified.  Round ligaments were extremely difficult to visualize but present.  Ureters unable to be identified nor palpated.      CONDITION: Stable, extubated, and transported to PACU.   INDICATIONS: 40yo multigravida with a history of a long standing uterine leiomyoma measuring above the umbilicus with abnormal uterine bleeding.  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  Her left  ovary was previously removed.  She was treated with Lupron prior to surgery and desired definitive surgical management.    OPERATION: Informed consent was obtained for the procedure and the patient was taken to the operating room with IV running. She was placed in the dorsal supine position and general endotracheal anesthesia was obtained without difficulty and found to be adequate. The patient was then placed in a dorsal lithotomy position. The patient was prepped and draped in the normal sterile fashion. A perioperative time out was performed.   Attention was turned to the vagina where a Perez catheter was placed into the bladder.  Using a lubricated Graves speculum, the cervix was identified, although it was flush to the vagina, small, and difficult to ascertain its location.  A single tooth tenaculum was placed on the anterior lip of the cervix.  The cervix was dilated to accommodate a small VCare.  It was noted the endometrial cavity deviated significantly posteriorly and to the patients right.  The VCare was placed into the uterus.  The speculum and tenaculum were removed.  Gloves were exchanged and attention brought to the abdomen.  A Pfannenstiel skin incision was made and carried down to the underlying fascia with the scalpel. The fascia was then nicked in the midline. This was extended laterally with the pickups and Cabrales scissors. The superior portion of the fascia was grasped with Kocher clamps x2 and elevated off of the underlying rectus muscles both bluntly and sharply with scalpel. Attention was then turned to the inferior portion of the fascia which was, in a similar fashion, grasped with Kocher clamps x2 and elevated off of the underlying rectus muscles both bluntly and sharply with Cabrales scissors. The rectus muscles were then  in the midline. The peritoneum was then entered bluntly and placed on stretch with excellent visualization.   Please see the above findings.  Dr Sanchez was called for  intraoperative consultation at this time.  Due to the complexity of the severe adhesive disease and the likely need for bowel surgery and or urologic surgery, it was determined the case should be halted in order to address these fully with the patient and prepare for these interventions.  Seprafilm was placed over the uterine structure.  The space of Retzius was noted to be hemostatic. The fascia was then closed with 0 Vicryl in a running fashion. Subcutaneous tissue was irrigated and noted to be hemostatic. The subcutaneous tissue was closed with 2-0 plain suture in a running fashion. The skin was closed with a running stitch of 4-0 Vicryl in a subcuticular fashion. Dermabond was applied.  The patient tolerated the procedure well. Needle and sponge counts were correct times two. Two grams of Ancef were given prior to the start of the procedure.  Anesthesia placed a TAP block prior to extubation and transport to the PACU.

## 2022-01-06 NOTE — ANESTHESIA PROCEDURE NOTES
TAP Procedure Note    Pre-Procedure   Staff -        Anesthesiologist:  Shantanu Torres MD       Performed By: anesthesiologist       Location: pre-op       Pre-Anesthestic Checklist: patient identified, IV checked, site marked, risks and benefits discussed, informed consent, monitors and equipment checked, pre-op evaluation, at physician/surgeon's request and post-op pain management  Timeout:       Correct Patient: Yes        Correct Procedure: Yes        Correct Site: Yes        Correct Position: Yes        Correct Laterality: Yes        Site Marked: Yes  Procedure Documentation  Procedure: TAP       Patient Position: supine       Skin prep: Chloraprep       Local skin infiltrated with 1 mL of 1% lidocaine.        Needle Type: insulated       Needle Gauge: 21.        Needle Length (millimeters): 100        Ultrasound guided       1. Ultrasound was used to identify targeted nerve, plexus, vascular marker, or fascial plane and place a needle adjacent to it in real-time.       2. Ultrasound was used to visualize the spread of anesthetic in close proximity to the above referenced structure.       3. A permanent image is entered into the patient's record.       4. The visualized anatomic structures appeared normal.       5. There were no apparent abnormal pathologic findings.    Assessment/Narrative         The placement was negative for: blood aspirated, painful injection and site bleeding       Paresthesias: No.     Bolus given via needle..        Secured via.        Insertion/Infusion Method: Single Shot       Complications: none     Comments:  Bolus via needle, 30 mL of 0.25% ropivacaine with 1:800,000 epinephrine, bilaterally, for a total of 60 mL.    Under ultrasound guidance, a 21 gauge needle was inserted and placed in close proximity to the transversus abdominus plane. Ultrasound was also used to visualize the spread of anesthetic in close proximity to the nerve being blocked. The nerve appeared  anatomically normal, and there were no apparent abnormal pathological findings. Patient tolerated well, was mildly sedated but communicative throughout the procedure. A permanent ultrasound image was saved in the patient's record.    The surgeon has given a verbal order transferring care of this patient to me for the performance of regional analgesia block for post op pain control. It is requested of me because I am uniquely trained and qualified to perform this block and the surgeon is neither trained nor qualified to perform this procedure.

## 2022-01-06 NOTE — ANESTHESIA PROCEDURE NOTES
Airway       Patient location during procedure: OR ([facility] - Operating Room or Procedural Area)       Procedure Start/Stop Times: 1/6/2022 1:53 PM  Staff -        Anesthesiologist:  Shantanu Torres MD       CRNA: Edda Mcneill APRN CRNA       Performed By: CRNA  Consent for Airway        Urgency: elective  Indications and Patient Condition       Indications for airway management: rose-procedural       Induction type:intravenous      Final Airway Details       Final airway type: endotracheal airway       Successful airway: ETT - single  Endotracheal Airway Details        ETT size (mm): 7.0       Cuffed: yes       Successful intubation technique: direct laryngoscopy       DL Blade Type: Patino 2       Grade View of Cords: 1       Adjucts: stylet       Position: Right       Measured from: gums/teeth       Secured at (cm): 21       Bite block used: None    Post intubation assessment        Number of attempts at approach: 1       Number of other approaches attempted: 0       Secured with: pink tape       Ease of procedure: easy       Dentition: Intact and Unchanged

## 2022-01-07 VITALS
HEART RATE: 68 BPM | TEMPERATURE: 98.1 F | SYSTOLIC BLOOD PRESSURE: 99 MMHG | BODY MASS INDEX: 23.66 KG/M2 | HEIGHT: 65 IN | OXYGEN SATURATION: 98 % | WEIGHT: 142 LBS | DIASTOLIC BLOOD PRESSURE: 51 MMHG | RESPIRATION RATE: 16 BRPM

## 2022-01-07 PROCEDURE — 258N000003 HC RX IP 258 OP 636: Performed by: OBSTETRICS & GYNECOLOGY

## 2022-01-07 PROCEDURE — 250N000013 HC RX MED GY IP 250 OP 250 PS 637: Performed by: OBSTETRICS & GYNECOLOGY

## 2022-01-07 RX ORDER — IBUPROFEN 800 MG/1
800 TABLET, FILM COATED ORAL EVERY 6 HOURS
Qty: 60 TABLET | Refills: 1 | Status: SHIPPED | OUTPATIENT
Start: 2022-01-07 | End: 2022-06-06

## 2022-01-07 RX ORDER — OXYCODONE HYDROCHLORIDE 5 MG/1
5 TABLET ORAL EVERY 6 HOURS PRN
Qty: 20 TABLET | Refills: 0 | Status: SHIPPED | OUTPATIENT
Start: 2022-01-07 | End: 2022-06-06

## 2022-01-07 RX ADMIN — SENNOSIDES AND DOCUSATE SODIUM 1 TABLET: 50; 8.6 TABLET ORAL at 08:32

## 2022-01-07 RX ADMIN — FAMOTIDINE 20 MG: 20 TABLET ORAL at 08:32

## 2022-01-07 RX ADMIN — OXYCODONE HYDROCHLORIDE 5 MG: 5 TABLET ORAL at 10:42

## 2022-01-07 RX ADMIN — IBUPROFEN 800 MG: 400 TABLET, FILM COATED ORAL at 03:08

## 2022-01-07 RX ADMIN — ACETAMINOPHEN 975 MG: 325 TABLET, FILM COATED ORAL at 05:48

## 2022-01-07 RX ADMIN — ACETAMINOPHEN 975 MG: 325 TABLET, FILM COATED ORAL at 00:59

## 2022-01-07 RX ADMIN — IBUPROFEN 800 MG: 400 TABLET, FILM COATED ORAL at 08:33

## 2022-01-07 RX ADMIN — OXYCODONE HYDROCHLORIDE 5 MG: 5 TABLET ORAL at 01:19

## 2022-01-07 RX ADMIN — SODIUM CHLORIDE, POTASSIUM CHLORIDE, SODIUM LACTATE AND CALCIUM CHLORIDE: 600; 310; 30; 20 INJECTION, SOLUTION INTRAVENOUS at 04:09

## 2022-01-07 RX ADMIN — OXYCODONE HYDROCHLORIDE 5 MG: 5 TABLET ORAL at 05:52

## 2022-01-07 ASSESSMENT — ACTIVITIES OF DAILY LIVING (ADL)
ADLS_ACUITY_SCORE: 7

## 2022-01-07 NOTE — DISCHARGE INSTRUCTIONS
Postoperative Instructions    Activity       Ask family and friends for help when you need it.    Do not place anything in your vagina for 6 weeks.    You are not restricted on other activities, but take it easy for a few weeks to allow your body to recover from surgery.  You are able to do any activities you feel up to that point.  If you cannot lift it with one hand, do not lift it for 2 weeks.    No driving until you have stopped taking your pain medications (usually two weeks after surgery).     Call your health care provider if you have any of these symptoms:       Increased pain, swelling, redness, or fluid around your stiches.    A fever above 100.4 F (38 C) with or without chills when placing a thermometer under your tongue.    You soak a sanitary pad with blood within 1 hour, or you see blood clots larger than a golf ball.    Bleeding that lasts more than 6 weeks.    Vaginal discharge that smells bad.    Severe pain, cramping or tenderness in your lower belly area.    A need to urinate more frequently (use the toilet more often), more urgently (use the toilet very quickly), or it burns when you urinate.    Nausea and vomiting.    Redness, swelling or pain around a vein in your leg.    Chest pain and cough or are gasping for air.    You have questions or concerns after you return home.     Keep your hands clean:  Always wash your hands before touching your perineal area and stitches.  This helps reduce your risk of infection.  If your hands aren't dirty, you may use an alcohol hand-rub to clean your hands. Keep your nails clean and short.

## 2022-01-07 NOTE — PROGRESS NOTES
S: Patient doing well with no overnight issues and no current complaints.  Pain is well controlled.  Tolerating PO intake. Ambulating without difficulty.  Voiding and passing flatus.  Denies fevers, headaches, changes in vision, chest pain, SOB, nausea, vomiting, diarrhea, constipation, RUQ tenderness, dysuria, or LE pain.    O:   Vitals:    01/06/22 2147 01/06/22 2314 01/07/22 0102 01/07/22 0554   BP:   106/59    BP Location:   Right arm    Patient Position:   Semi-Hauser's    Pulse:   102 78   Resp:  15 13 18   Temp:   (!) 95.1  F (35.1  C) 97.4  F (36.3  C)   TempSrc:   Axillary Axillary   SpO2: 98%  97% 98%   Weight:       Height:         NAD, AAOx3, RRR, CTAB, ABd soft NTND, incision c/d/i, LE NT no edema    A: 42yo s/POD#1 Exploratory laparotomy with findings of severe pelvic adhesive disease who is recovering well.    P: Routine postoperative care.  Plan for discharge today.  Discussed in length surgical findings and eventual plan of care.   Quality 226: Preventive Care And Screening: Tobacco Use: Screening And Cessation Intervention: Patient screened for tobacco use and is an ex/non-smoker Quality 130: Documentation Of Current Medications In The Medical Record: Current Medications Documented Detail Level: Detailed Quality 402: Tobacco Use And Help With Quitting Among Adolescents: Patient screened for tobacco and never smoked Quality 110: Preventive Care And Screening: Influenza Immunization: Influenza Immunization Administered during Influenza season

## 2022-01-07 NOTE — PLAN OF CARE
POD 1: A&Ox4. VSS ex tachy at times. C/o mild/moderate pain in lower abdomen, PRN oxycodone and cheikh Ibuprofen given. Incision along lower abdomen REYNA. BS hypoactive, denies passing flatus. Voiding adequately in BR. Ax1 w/ GB. LR @ 100.

## 2022-01-07 NOTE — PLAN OF CARE
A&OX4. VSS on RA. Transverse incision CDI, REYNA. Abdominal binder on. Walking in room independently.  at bedside. Oxycodone and schedule ibuprofen given for pain. Questions and concerns answered. Pt discharge to home.

## 2022-01-07 NOTE — PLAN OF CARE
Summary: Exploratory surgery of abdomin  DATE & TIME: 1/6/2022, [id number]   Cognitive Concerns/ Orientation : A & O x 4, calm and cooperative    BEHAVIOR & AGGRESSION TOOL COLOR: Green   CIWA SCORE: NA    ABNL VS/O2: VSS on RA, low grade temp (MAX 99.5). tachy at times (low 100s). CAPNO in place- WDL   MOBILITY: x 1 assist GB/W, Generalized weakness  PAIN MANAGMENT: reporting pain to lower abdomin incision, managed well with ice and PRN tylenol and dilaudid. Scheduled ibuprofen   DIET: Clears, tolerating, denies N/V  BOWEL/BLADDER: BS active x 4, alcantara during surgery-was removed, voided during shift, slight bleeding, BM in the am and passing gas    ABNL LAB/BG: NA   DRAIN/DEVICES: PIV infusing LR @ 100 mL/hr   TELEMETRY RHYTHM: NA   SKIN: Horizontal abdominal incision-REYNA, CDI    TESTS/PROCEDURES: exploratory abdominal surgery   D/C DATE: pending   Discharge Barriers: clinical improvement   OTHER IMPORTANT INFO: abdominal binder given to patient. Pt.  would like to be here when MD rounds in the am.

## 2022-01-10 NOTE — ANESTHESIA POSTPROCEDURE EVALUATION
Patient: Aidan Mays    Procedure: Procedure(s):  Laparotomy exploratory       Diagnosis:Intramural uterine fibroid [D25.1]  Diagnosis Additional Information: No value filed.    Anesthesia Type:  General    Note:  Anesthesia Post Evaluation    Last vitals:  Vitals Value Taken Time   /60 01/06/22 1640   Temp 36.7  C (98  F) 01/06/22 1544   Pulse 76 01/06/22 1640   Resp 12 01/06/22 1640   SpO2 97 % 01/06/22 1640       Electronically Signed By: Shantanu Torres MD  January 10, 2022  7:03 AM

## 2022-01-15 ENCOUNTER — HEALTH MAINTENANCE LETTER (OUTPATIENT)
Age: 42
End: 2022-01-15

## 2022-01-16 NOTE — DISCHARGE SUMMARY
[facility] Discharge Summary    Aidan Mays MRN# [id number]   Age: 41 year old YOB: 1980     Date of Admission:  1/6/2022  Date of Discharge::  1/7/2022 11:46 AM   Admitting Physician:  Scotty Garcia MD  Discharge Physician:  Scotty Garcia MD     Home clinic: [facility]          Admission Diagnoses:   Intramural uterine fibroid [D25.1]            Discharge Diagnosis:   Same, Endometriosis and Severe pelvic adhesive disease          Procedures:   Procedure(s): Exploratory laparotomy              Medications Prior to Admission:     No medications prior to admission.             Discharge Medications:     Discharge Medication List as of 1/7/2022 11:20 AM      START taking these medications    Details   ibuprofen (ADVIL/MOTRIN) 800 MG tablet Take 1 tablet (800 mg) by mouth every 6 hours, Disp-60 tablet, R-1, Local Print      oxyCODONE (ROXICODONE) 5 MG tablet Take 1 tablet (5 mg) by mouth every 6 hours as needed for severe pain, Disp-20 tablet, R-0, Local Print         CONTINUE these medications which have NOT CHANGED    Details   acetaminophen (TYLENOL) 325 MG tablet Take 325-650 mg by mouth daily as needed for mild pain, Historical      ferrous sulfate (FEROSUL) 325 (65 Fe) MG tablet Take 325 mg by mouth daily (with breakfast), Historical      fluticasone (FLONASE) 50 MCG/ACT nasal spray Spray 1 spray into both nostrils daily as needed for rhinitis or allergies, Historical      leuprolide (LUPRON DEPOT) 3.75 MG kit Inject 3.75 mg into the muscle every 3 months, Historical      Multiple Vitamin (MULTI-VITAMIN) per tablet Take 1 tablet by mouth daily., Historical      Probiotic Product (PROBIOTIC DAILY) CAPS Take 1 capsule by mouth every evening, Historical                   Consultations:   Dr. Any Sanchez, [facility] for intraoperative consultation         Hospital Course:   The patient's hospital course was unremarkable.  She recovered as anticipated and  experienced no post-operative complications.  Upon discharge, her pain was well controlled. Vaginal bleeding is mild to moderate.  Voiding without difficulty.  Ambulating well and tolerating a normal diet.  No fever or significant wound drainage. She was discharged on post-operative day #1.    Post-partum hemoglobin:   Hemoglobin   Date Value Ref Range Status   01/06/2022 13.1 11.7 - 15.7 g/dL Final   08/15/2011 13.7 11.7 - 15.7 g/dL Final             Discharge Instructions and Follow-Up:   Discharge diet: Regular   Discharge activity: Activity as tolerated, no lifting greater than 10 lbs   Discharge follow-up: Follow up with consultant in 2 weeks for post operative visit   Wound care: Keep wound clean and dry           Discharge Disposition:   Discharged to home      Attestation:  I have reviewed today's vital signs, notes, medications, labs and imaging.  Amount of time performed on this discharge summary: 10 minutes.    Scotty Garcia MD

## 2022-04-22 PROBLEM — D21.9 FIBROIDS: Status: ACTIVE | Noted: 2022-04-22

## 2022-04-25 ENCOUNTER — MEDICAL CORRESPONDENCE (OUTPATIENT)
Dept: HEALTH INFORMATION MANAGEMENT | Facility: CLINIC | Age: 42
End: 2022-04-25

## 2022-06-02 ASSESSMENT — ENCOUNTER SYMPTOMS
BREAST MASS: 0
HEADACHES: 0
HEMATOCHEZIA: 0
NAUSEA: 0
ARTHRALGIAS: 0
PALPITATIONS: 0
MYALGIAS: 0
EYE PAIN: 0
HEARTBURN: 0
ABDOMINAL PAIN: 0
PARESTHESIAS: 0
NERVOUS/ANXIOUS: 0
JOINT SWELLING: 0
COUGH: 0
SHORTNESS OF BREATH: 0
HEMATURIA: 0
CHILLS: 0
FREQUENCY: 0
CONSTIPATION: 0
SORE THROAT: 0
FEVER: 0
DYSURIA: 0
DIARRHEA: 0
DIZZINESS: 0
WEAKNESS: 0

## 2022-06-06 ENCOUNTER — OFFICE VISIT (OUTPATIENT)
Dept: FAMILY MEDICINE | Facility: CLINIC | Age: 42
End: 2022-06-06
Payer: COMMERCIAL

## 2022-06-06 VITALS
HEART RATE: 71 BPM | WEIGHT: 146 LBS | DIASTOLIC BLOOD PRESSURE: 65 MMHG | OXYGEN SATURATION: 100 % | TEMPERATURE: 97.7 F | HEIGHT: 63 IN | SYSTOLIC BLOOD PRESSURE: 110 MMHG | BODY MASS INDEX: 25.87 KG/M2

## 2022-06-06 DIAGNOSIS — E04.1 THYROID NODULE: ICD-10-CM

## 2022-06-06 DIAGNOSIS — Z00.00 ANNUAL PHYSICAL EXAM: Primary | ICD-10-CM

## 2022-06-06 PROCEDURE — 90471 IMMUNIZATION ADMIN: CPT | Performed by: FAMILY MEDICINE

## 2022-06-06 PROCEDURE — 99213 OFFICE O/P EST LOW 20 MIN: CPT | Mod: 25 | Performed by: FAMILY MEDICINE

## 2022-06-06 PROCEDURE — 90714 TD VACC NO PRESV 7 YRS+ IM: CPT | Performed by: FAMILY MEDICINE

## 2022-06-06 PROCEDURE — 99386 PREV VISIT NEW AGE 40-64: CPT | Mod: 25 | Performed by: FAMILY MEDICINE

## 2022-06-06 RX ORDER — RELUGOLIX, ESTRADIOL HEMIHYDRATE, AND NORETHINDRONE ACETATE 40; 1; .5 MG/1; MG/1; MG/1
1 TABLET, FILM COATED ORAL DAILY
Status: ON HOLD | COMMUNITY
Start: 2022-05-31 | End: 2022-07-13

## 2022-06-06 RX ORDER — DOCUSATE SODIUM 100 MG/1
100 CAPSULE, LIQUID FILLED ORAL 2 TIMES DAILY PRN
COMMUNITY
Start: 2022-06-06

## 2022-06-06 ASSESSMENT — ENCOUNTER SYMPTOMS
BREAST MASS: 0
SHORTNESS OF BREATH: 0
COUGH: 0
DIZZINESS: 0
DIARRHEA: 0
MYALGIAS: 0
JOINT SWELLING: 0
NAUSEA: 0
CHILLS: 0
FREQUENCY: 0
SORE THROAT: 0
PALPITATIONS: 0
CONSTIPATION: 0
DYSURIA: 0
ARTHRALGIAS: 0
FEVER: 0
PARESTHESIAS: 0
EYE PAIN: 0
HEMATOCHEZIA: 0
ABDOMINAL PAIN: 0
HEARTBURN: 0
NERVOUS/ANXIOUS: 0
WEAKNESS: 0
HEMATURIA: 0
HEADACHES: 0

## 2022-06-06 ASSESSMENT — PAIN SCALES - GENERAL: PAINLEVEL: NO PAIN (0)

## 2022-06-06 NOTE — NURSING NOTE
Prior to immunization administration, verified patients identity using patient s name and date of birth. Please see Immunization Activity for additional information.     Screening Questionnaire for Adult Immunization    Are you sick today?   No   Do you have allergies to medications, food, a vaccine component or latex?   No   Have you ever had a serious reaction after receiving a vaccination?   No   Do you have a long-term health problem with heart, lung, kidney, or metabolic disease (e.g., diabetes), asthma, a blood disorder, no spleen, complement component deficiency, a cochlear implant, or a spinal fluid leak?  Are you on long-term aspirin therapy?   No   Do you have cancer, leukemia, HIV/AIDS, or any other immune system problem?   No   Do you have a parent, brother, or sister with an immune system problem?   No   In the past 3 months, have you taken medications that affect  your immune system, such as prednisone, other steroids, or anticancer drugs; drugs for the treatment of rheumatoid arthritis, Crohn s disease, or psoriasis; or have you had radiation treatments?   No   Have you had a seizure, or a brain or other nervous system problem?   No   During the past year, have you received a transfusion of blood or blood    products, or been given immune (gamma) globulin or antiviral drug?   No   For women: Are you pregnant or is there a chance you could become       pregnant during the next month?   No   Have you received any vaccinations in the past 4 weeks?   No     Immunization questionnaire answers were all negative.        Per orders of Dr. Castro, injection of TD given by Renate Choi. Patient instructed to remain in clinic for 15 minutes afterwards, and to report any adverse reaction to me immediately.       Screening performed by Renate Choi on 6/6/2022 at 4:50 PM.'

## 2022-06-06 NOTE — PROGRESS NOTES
SUBJECTIVE:   CC: Aidan Mays is an 41 year old woman who presents for preventive health visit.       Patient has been advised of split billing requirements and indicates understanding: Yes  Healthy Habits:     Getting at least 3 servings of Calcium per day:  Yes    Bi-annual eye exam:  NO    Dental care twice a year:  Yes    Sleep apnea or symptoms of sleep apnea:  None    Diet:  Vegetarian/vegan    Frequency of exercise:  1 day/week    Duration of exercise:  15-30 minutes    Taking medications regularly:  Yes    Medication side effects:  None    PHQ-2 Total Score: 0    Additional concerns today:  No          History of endometriosis.  Patient scheduled for  surgery next month for that.  Previous records reviewed.  Patient  Had a thyroid nodule which was biopsied previously.  Patient never had a repeat thyroid ultrasound done.    Today's PHQ-2 Score:   PHQ-2 ( 1999 [employer]) 6/2/2022   Q1: Little interest or pleasure in doing things 0   Q2: Feeling down, depressed or hopeless 0   PHQ-2 Score 0   Q1: Little interest or pleasure in doing things Not at all   Q2: Feeling down, depressed or hopeless Not at all   PHQ-2 Score 0       Abuse: Current or Past (Physical, Sexual or Emotional) - No  Do you feel safe in your environment? Yes    Have you ever done Advance Care Planning? (For example, a Health Directive, POLST, or a discussion with a medical provider or your loved ones about your wishes):     Social History     Tobacco Use     Smoking status: Never Smoker     Smokeless tobacco: Never Used   Substance Use Topics     Alcohol use: No     Alcohol/week: 0.0 standard drinks         Alcohol Use 6/2/2022   Prescreen: >3 drinks/day or >7 drinks/week? No       Reviewed orders with patient.  Reviewed health maintenance and updated orders accordingly - Yes  Patient Active Problem List   Diagnosis     CARDIOVASCULAR SCREENING; LDL GOAL LESS THAN 160     Thyroid nodule     Endometriosis     S/P laparotomy     Fibroids      Pelvic adhesions     Past Surgical History:   Procedure Laterality Date     C/SECTION, LOW TRANSVERSE      twins     FINE NEEDLE ASPIRATION WITHOUT IMAGING GUIDANCE  02/21/2011    left thyroid nodule     FRACTURE SURGERY Left     leg     LAPAROTOMY EXPLORATORY N/A 01/06/2022    Procedure: Laparotomy exploratory;  Surgeon: Scotty Garcia MD;  Location:  OR       Social History     Tobacco Use     Smoking status: Never Smoker     Smokeless tobacco: Never Used   Substance Use Topics     Alcohol use: No     Alcohol/week: 0.0 standard drinks     Family History   Problem Relation Age of Onset     Fibroids Mother      Hypertension Mother      Breast Cancer Maternal Aunt 62         Current Outpatient Medications   Medication Sig Dispense Refill     docusate sodium (COLACE) 100 MG capsule Take 1 capsule (100 mg) by mouth 2 times daily as needed for constipation       ferrous sulfate (FEROSUL) 325 (65 Fe) MG tablet Take 325 mg by mouth daily (with breakfast)       Multiple Vitamin (MULTI-VITAMIN) per tablet Take 1 tablet by mouth daily.       MYFEMBREE 40-1-0.5 MG TABS        No Known Allergies    Breast Cancer Screening:    FHS-7:   Breast CA Risk Assessment (FHS-7) 7/21/2021 6/2/2022   Did any of your first-degree relatives have breast or ovarian cancer? Yes Yes   Did any of your relatives have bilateral breast cancer? No Unknown   Did any man in your family have breast cancer? No No   Did any woman in your family have breast and ovarian cancer? Yes Yes   Did any woman in your family have breast cancer before age 50 y? No No   Do you have 2 or more relatives with breast and/or ovarian cancer? No No   Do you have 2 or more relatives with breast and/or bowel cancer? No No         Pertinent mammograms are reviewed under the imaging tab.    History of abnormal Pap smear: NO - age 30-65 PAP every 5 years with negative HPV co-testing recommended     Reviewed and updated as needed this visit by clinical staff   Tobacco   "Allergies   Problems  Med Hx  Surg Hx  Fam Hx            Reviewed and updated as needed this visit by Provider   Tobacco  Allergies   Problems  Med Hx  Surg Hx  Fam Hx               Review of Systems   Constitutional: Negative for chills and fever.   HENT: Negative for congestion, ear pain, hearing loss and sore throat.    Eyes: Negative for pain and visual disturbance.   Respiratory: Negative for cough and shortness of breath.    Cardiovascular: Negative for chest pain, palpitations and peripheral edema.   Gastrointestinal: Negative for abdominal pain, constipation, diarrhea, heartburn, hematochezia and nausea.   Breasts:  Negative for tenderness, breast mass and discharge.   Genitourinary: Negative for dysuria, frequency, genital sores, hematuria, pelvic pain, urgency, vaginal bleeding and vaginal discharge.   Musculoskeletal: Negative for arthralgias, joint swelling and myalgias.   Skin: Negative for rash.   Neurological: Negative for dizziness, weakness, headaches and paresthesias.   Psychiatric/Behavioral: Negative for mood changes. The patient is not nervous/anxious.           OBJECTIVE:   /65   Pulse 71   Temp 97.7  F (36.5  C) (Tympanic)   Ht 1.61 m (5' 3.39\")   Wt 66.2 kg (146 lb)   SpO2 100%   BMI 25.55 kg/m    Physical Exam  GENERAL: healthy, alert and no distress  EYES: Eyes grossly normal to inspection, PERRL and conjunctivae and sclerae normal  HENT: ear canals and TM's normal, nose and mouth without ulcers or lesions  NECK: Thyroid nodularity felt on palpation.  No tenderness.  RESP: lungs clear to auscultation - no rales, rhonchi or wheezes  BREAST: normal without masses, tenderness or nipple discharge and no palpable axillary masses or adenopathy  CV: regular rate and rhythm, normal S1 S2, no S3 or S4, no murmur, click or rub, no peripheral edema and peripheral pulses strong  ABDOMEN: soft, nontender, no hepatosplenomegaly, no masses and bowel sounds normal  MS: no gross " "musculoskeletal defects noted, no edema  SKIN: no suspicious lesions or rashes  NEURO: Normal strength and tone, mentation intact and speech normal  PSYCH: mentation appears normal, affect normal/bright        ASSESSMENT/PLAN:       1. Annual physical exam    Patient established care today. All her records were reviewed.  She will be coming back for preop exam next month before her surgery.  Recommending to proceed with a tetanus shot which she agrees to.      2. Thyroid nodule  Recommending a repeat thyroid ultrasound to check for stability of the thyroid nodule.   - US Thyroid; Future      COUNSELING:  Reviewed preventive health counseling, as reflected in patient instructions       Regular exercise       Healthy diet/nutrition    Estimated body mass index is 25.55 kg/m  as calculated from the following:    Height as of this encounter: 1.61 m (5' 3.39\").    Weight as of this encounter: 66.2 kg (146 lb).      She reports that she has never smoked. She has never used smokeless tobacco.      Counseling Resources:  ATP IV Guidelines  Pooled Cohorts Equation Calculator  Breast Cancer Risk Calculator  BRCA-Related Cancer Risk Assessment: FHS-7 Tool  FRAX Risk Assessment  ICSI Preventive Guidelines  Dietary Guidelines for Americans, 2010  USDA's MyPlate  ASA Prophylaxis  Lung CA Screening    Sloane Castro MD  [facility] [place]  "

## 2022-06-13 ENCOUNTER — TELEPHONE (OUTPATIENT)
Dept: WOUND CARE | Facility: CLINIC | Age: 42
End: 2022-06-13
Payer: COMMERCIAL

## 2022-06-13 NOTE — TELEPHONE ENCOUNTER
[facility]    Received fax referral from [facility] for stoma marking.     Called patient, appointment created for July1 Fri at 9am.       Sonia TAPIA   Dept. Pager: [phone]  Dept. Office Number: [phone]

## 2022-06-17 ENCOUNTER — ANCILLARY PROCEDURE (OUTPATIENT)
Dept: ULTRASOUND IMAGING | Facility: CLINIC | Age: 42
End: 2022-06-17
Attending: FAMILY MEDICINE
Payer: COMMERCIAL

## 2022-06-17 DIAGNOSIS — E04.1 THYROID NODULE: ICD-10-CM

## 2022-06-17 PROCEDURE — 76536 US EXAM OF HEAD AND NECK: CPT

## 2022-07-01 ENCOUNTER — HOSPITAL ENCOUNTER (OUTPATIENT)
Dept: WOUND CARE | Facility: CLINIC | Age: 42
Discharge: HOME | End: 2022-07-01
Payer: COMMERCIAL

## 2022-07-01 PROCEDURE — 999N000199 HC STATISTIC WOC PT EDUCATION, 31-45 MIN

## 2022-07-01 NOTE — PROGRESS NOTES
"[facility] Nurse Ostomy Marking and Education         Data:   History:    Exploratory laparotomy, total abdominal hysterectomy, right salpingo oophorectomy      Pt referred by Dr. Sanchez and Louis    Who is present for marking and education: patient    Type of ostomy surgery:  Possible ostomy    Abdomen:  Flat, non distended, soft           Intervention:     Patient's chart evaluated.      Assessments done today:  Belt line, previous abdominal surgeries and scars, abdominal muscles and pt observed lying, sitting and standing.      Education: Reviewed upcoming surgery, stoma construction, post-op expectations, general ostomy cares/concerns including: differenced between colostomy/ileostomy/ urostomy, diet and hydration, bathing, odor, output,  activity, appliances and emptying.      All patient / family questions answered:  YES    Patient marked with \"x\" using surgical marker and scratch method then allowed to dry 3 minutes and sealed with 3M No Sting Skin Prep. Pt marked RLQ and LLQ          Assessment:       Abdomen marked on the RLQ and LLQ    Learning needs/ comprehension: majority of teaching to be completed postop         Plan:     Plan:   ? Surgery scheduled for:  7/13    Will plan to follow patient post operatively.  Daily M-F        "

## 2022-07-05 ENCOUNTER — OFFICE VISIT (OUTPATIENT)
Dept: FAMILY MEDICINE | Facility: CLINIC | Age: 42
End: 2022-07-05
Payer: COMMERCIAL

## 2022-07-05 VITALS
DIASTOLIC BLOOD PRESSURE: 79 MMHG | SYSTOLIC BLOOD PRESSURE: 114 MMHG | TEMPERATURE: 98 F | WEIGHT: 146 LBS | OXYGEN SATURATION: 98 % | HEIGHT: 63 IN | BODY MASS INDEX: 25.87 KG/M2 | HEART RATE: 73 BPM

## 2022-07-05 DIAGNOSIS — Z11.59 NEED FOR HEPATITIS C SCREENING TEST: ICD-10-CM

## 2022-07-05 DIAGNOSIS — N80.9 ENDOMETRIOSIS: ICD-10-CM

## 2022-07-05 DIAGNOSIS — Z11.4 SCREENING FOR HIV (HUMAN IMMUNODEFICIENCY VIRUS): ICD-10-CM

## 2022-07-05 DIAGNOSIS — Z01.818 PREOP GENERAL PHYSICAL EXAM: Primary | ICD-10-CM

## 2022-07-05 DIAGNOSIS — N73.6 PELVIC ADHESIONS: ICD-10-CM

## 2022-07-05 LAB
ANION GAP SERPL CALCULATED.3IONS-SCNC: 6 MMOL/L (ref 3–14)
BUN SERPL-MCNC: 11 MG/DL (ref 7–30)
CALCIUM SERPL-MCNC: 9.3 MG/DL (ref 8.5–10.1)
CHLORIDE BLD-SCNC: 106 MMOL/L (ref 94–109)
CO2 SERPL-SCNC: 26 MMOL/L (ref 20–32)
CREAT SERPL-MCNC: 0.74 MG/DL (ref 0.52–1.04)
ERYTHROCYTE [DISTWIDTH] IN BLOOD BY AUTOMATED COUNT: 13.5 % (ref 10–15)
GFR SERPL CREATININE-BSD FRML MDRD: >90 ML/MIN/1.73M2
GLUCOSE BLD-MCNC: 95 MG/DL (ref 70–99)
HCG SERPL QL: NEGATIVE
HCT VFR BLD AUTO: 44 % (ref 35–47)
HCV AB SERPL QL IA: NONREACTIVE
HGB BLD-MCNC: 14.9 G/DL (ref 11.7–15.7)
HIV 1+2 AB+HIV1 P24 AG SERPL QL IA: NONREACTIVE
MCH RBC QN AUTO: 31 PG (ref 26.5–33)
MCHC RBC AUTO-ENTMCNC: 33.9 G/DL (ref 31.5–36.5)
MCV RBC AUTO: 92 FL (ref 78–100)
PLATELET # BLD AUTO: 208 10E3/UL (ref 150–450)
POTASSIUM BLD-SCNC: 4.4 MMOL/L (ref 3.4–5.3)
RBC # BLD AUTO: 4.8 10E6/UL (ref 3.8–5.2)
SODIUM SERPL-SCNC: 138 MMOL/L (ref 133–144)
WBC # BLD AUTO: 5.2 10E3/UL (ref 4–11)

## 2022-07-05 PROCEDURE — 84703 CHORIONIC GONADOTROPIN ASSAY: CPT | Performed by: FAMILY MEDICINE

## 2022-07-05 PROCEDURE — 86803 HEPATITIS C AB TEST: CPT | Performed by: FAMILY MEDICINE

## 2022-07-05 PROCEDURE — 36415 COLL VENOUS BLD VENIPUNCTURE: CPT | Performed by: FAMILY MEDICINE

## 2022-07-05 PROCEDURE — 99214 OFFICE O/P EST MOD 30 MIN: CPT | Performed by: FAMILY MEDICINE

## 2022-07-05 PROCEDURE — 87389 HIV-1 AG W/HIV-1&-2 AB AG IA: CPT | Performed by: FAMILY MEDICINE

## 2022-07-05 PROCEDURE — 85027 COMPLETE CBC AUTOMATED: CPT | Performed by: FAMILY MEDICINE

## 2022-07-05 PROCEDURE — 80048 BASIC METABOLIC PNL TOTAL CA: CPT | Performed by: FAMILY MEDICINE

## 2022-07-05 ASSESSMENT — PAIN SCALES - GENERAL: PAINLEVEL: NO PAIN (0)

## 2022-07-05 NOTE — H&P (VIEW-ONLY)
[address] [address]  Phone: [phone]  Primary Provider: Terry [place]  Pre-op Performing Provider: NARGIS ROLLE      PREOPERATIVE EVALUATION:  Today's date: 7/5/2022    Aidan Mays is a 41 year old female who presents for a preoperative evaluation.    Surgical Information:  Surgery/Procedure: exploratory laparotomy ; total abdominal hysterectomy ; right salpingo oophorectomy, exploratory laparotomy ; possible bowel resection ; possible stoma, CYSTOSCOPY,PLACING BILATERAL  URETERAL CATHETERS  Surgery Location: [facility]  Surgeon: Dr Sanchez  Surgery Date: 07/13/2022  Time of Surgery: 730am  Where patient plans to recover: Other: TBD  Fax number for surgical facility: Note does not need to be faxed, will be available electronically in Epic.    Type of Anesthesia Anticipated: General    Assessment & Plan     The proposed surgical procedure is considered INTERMEDIATE risk.    Preop general physical exam    - CBC with platelets; Future  - Basic metabolic panel; Future  - HCG qualitative; Future  - CBC with platelets  - Basic metabolic panel  - HCG qualitative    Endometriosis      Pelvic adhesions      Screening for HIV (human immunodeficiency virus)    - HIV Antigen Antibody Combo; Future  - HIV Antigen Antibody Combo    Need for hepatitis C screening test    - Hepatitis C Screen Reflex to HCV RNA Quant and Genotype; Future  - Hepatitis C Screen Reflex to HCV RNA Quant and Genotype                 RECOMMENDATION:  APPROVAL GIVEN to proceed with proposed procedure, without further diagnostic evaluation.                      Subjective     HPI related to upcoming procedure:     Preop Questions 7/4/2022   1. Have you ever had a heart attack or stroke? No   2. Have you ever had surgery on your heart or blood vessels, such as a stent placement, a coronary artery bypass, or surgery on an artery in your head, neck, heart, or  legs? No   3. Do you have chest pain with activity? No   4. Do you have a history of  heart failure? No   5. Do you currently have a cold, bronchitis or symptoms of other infection? No   6. Do you have a cough, shortness of breath, or wheezing? No   7. Do you or anyone in your family have previous history of blood clots? No   8. Do you or does anyone in your family have a serious bleeding problem such as prolonged bleeding following surgeries or cuts? No   9. Have you ever had problems with anemia or been told to take iron pills? YES -    10. Have you had any abnormal blood loss such as black, tarry or bloody stools, or abnormal vaginal bleeding? No   11. Have you ever had a blood transfusion? No   12. Are you willing to have a blood transfusion if it is medically needed before, during, or after your surgery? Yes   13. Have you or any of your relatives ever had problems with anesthesia? No   14. Do you have sleep apnea, excessive snoring or daytime drowsiness? No   15. Do you have any artifical heart valves or other implanted medical devices like a pacemaker, defibrillator, or continuous glucose monitor? No   16. Do you have artificial joints? No   17. Are you allergic to latex? No   18. Is there any chance that you may be pregnant? No       Health Care Directive:  Patient does not have a Health Care Directive or Living Will:     Preoperative Review of :            Review of Systems  CONSTITUTIONAL: NEGATIVE for fever, chills, change in weight  INTEGUMENTARY/SKIN: NEGATIVE for worrisome rashes, moles or lesions  EYES: NEGATIVE for vision changes or irritation  ENT/MOUTH: NEGATIVE for ear, mouth and throat problems  RESP: NEGATIVE for significant cough or SOB  CV: NEGATIVE for chest pain, palpitations or peripheral edema  GI: NEGATIVE for nausea, abdominal pain, heartburn, or change in bowel habits  : NEGATIVE for frequency, dysuria, or hematuria  MUSCULOSKELETAL: NEGATIVE for significant arthralgias or  myalgia  NEURO: NEGATIVE for weakness, dizziness or paresthesias  ENDOCRINE: NEGATIVE for temperature intolerance, skin/hair changes  HEME: NEGATIVE for bleeding problems  PSYCHIATRIC: NEGATIVE for changes in mood or affect    Patient Active Problem List    Diagnosis Date Noted     Fibroids 04/22/2022     Priority: Medium     Pelvic adhesions 04/22/2022     Priority: Medium     S/P laparotomy 01/06/2022     Priority: Medium     Endometriosis      Priority: Medium     CARDIOVASCULAR SCREENING; LDL GOAL LESS THAN 160 02/23/2011     Priority: Medium     Thyroid nodule 02/03/2011     Priority: Medium     left complex nodule        Past Medical History:   Diagnosis Date     Endometriosis      Pityriasis rosea 2/2016     Thyroid nodule 2/3/2011    left complex nodule     Past Surgical History:   Procedure Laterality Date     C/SECTION, LOW TRANSVERSE      twins     FINE NEEDLE ASPIRATION WITHOUT IMAGING GUIDANCE  02/21/2011    left thyroid nodule     FRACTURE SURGERY Left     leg     LAPAROTOMY EXPLORATORY N/A 01/06/2022    Procedure: Laparotomy exploratory;  Surgeon: Scotty Garcia MD;  Location:  OR     Current Outpatient Medications   Medication Sig Dispense Refill     docusate sodium (COLACE) 100 MG capsule Take 1 capsule (100 mg) by mouth 2 times daily as needed for constipation       ferrous sulfate (FEROSUL) 325 (65 Fe) MG tablet Take 325 mg by mouth daily (with breakfast)       Multiple Vitamin (MULTI-VITAMIN) per tablet Take 1 tablet by mouth daily.       MYFEMBREE 40-1-0.5 MG TABS          No Known Allergies     Social History     Tobacco Use     Smoking status: Never Smoker     Smokeless tobacco: Never Used   Substance Use Topics     Alcohol use: No     Alcohol/week: 0.0 standard drinks     Family History   Problem Relation Age of Onset     Fibroids Mother      Hypertension Mother      Breast Cancer Maternal Aunt 62     History   Drug Use No         Objective     /79   Pulse 73   Temp 98  F  "(36.7  C) (Temporal)   Ht 1.6 m (5' 3\")   Wt 66.2 kg (146 lb)   SpO2 98%   BMI 25.86 kg/m      Physical Exam    GENERAL APPEARANCE: healthy, alert and no distress     EYES: EOMI, PERRL     HENT: ear canals and TM's normal and nose and mouth without ulcers or lesions     NECK: no adenopathy, no asymmetry, masses, or scars and thyroid normal to palpation     RESP: lungs clear to auscultation - no rales, rhonchi or wheezes     CV: regular rates and rhythm, normal S1 S2, no S3 or S4 and no murmur, click or rub     ABDOMEN:  soft, nontender, no HSM or masses and bowel sounds normal     MS: extremities normal- no gross deformities noted, no evidence of inflammation in joints, FROM in all extremities.     SKIN: no suspicious lesions or rashes     NEURO: Normal strength and tone, sensory exam grossly normal, mentation intact and speech normal     PSYCH: mentation appears normal. and affect normal/bright     LYMPHATICS: No cervical adenopathy    Recent Labs   Lab Test 01/06/22  1158   HGB 13.1        Diagnostics:  Results for orders placed or performed in visit on 07/05/22   HIV Antigen Antibody Combo     Status: Normal   Result Value Ref Range    HIV Antigen Antibody Combo Nonreactive Nonreactive   Hepatitis C Screen Reflex to HCV RNA Quant and Genotype     Status: Normal   Result Value Ref Range    Hepatitis C Antibody Nonreactive Nonreactive    Narrative    Assay performance characteristics have not been established for newborns, infants, and children.   CBC with platelets     Status: Normal   Result Value Ref Range    WBC Count 5.2 4.0 - 11.0 10e3/uL    RBC Count 4.80 3.80 - 5.20 10e6/uL    Hemoglobin 14.9 11.7 - 15.7 g/dL    Hematocrit 44.0 35.0 - 47.0 %    MCV 92 78 - 100 fL    MCH 31.0 26.5 - 33.0 pg    MCHC 33.9 31.5 - 36.5 g/dL    RDW 13.5 10.0 - 15.0 %    Platelet Count 208 150 - 450 10e3/uL    Narrative    Results confirmed by repeat test.     Basic metabolic panel     Status: Normal   Result Value Ref Range "    Sodium 138 133 - 144 mmol/L    Potassium 4.4 3.4 - 5.3 mmol/L    Chloride 106 94 - 109 mmol/L    Carbon Dioxide (CO2) 26 20 - 32 mmol/L    Anion Gap 6 3 - 14 mmol/L    Urea Nitrogen 11 7 - 30 mg/dL    Creatinine 0.74 0.52 - 1.04 mg/dL    Calcium 9.3 8.5 - 10.1 mg/dL    Glucose 95 70 - 99 mg/dL    GFR Estimate >90 >60 mL/min/1.73m2   HCG qualitative     Status: Normal   Result Value Ref Range    hCG Serum Qualitative Negative Negative            Revised Cardiac Risk Index (RCRI):  The patient has the following serious cardiovascular risks for perioperative complications:   - No serious cardiac risks = 0 points     RCRI Interpretation: 0 points: Class I (very low risk - 0.4% complication rate)           Signed Electronically by: Sloane Castro MD  Copy of this evaluation report is provided to requesting physician.

## 2022-07-09 ENCOUNTER — TRANSFERRED RECORDS (OUTPATIENT)
Dept: HEALTH INFORMATION MANAGEMENT | Facility: CLINIC | Age: 42
End: 2022-07-09

## 2022-07-12 ENCOUNTER — ANESTHESIA EVENT (OUTPATIENT)
Dept: SURGERY | Facility: CLINIC | Age: 42
DRG: 743 | End: 2022-07-12
Payer: COMMERCIAL

## 2022-07-12 RX ORDER — METRONIDAZOLE 500 MG/1
500 TABLET ORAL SEE ADMIN INSTRUCTIONS
Status: ON HOLD | COMMUNITY
End: 2022-07-13

## 2022-07-12 RX ORDER — NEOMYCIN SULFATE 500 MG/1
1000 TABLET ORAL SEE ADMIN INSTRUCTIONS
Status: ON HOLD | COMMUNITY
End: 2022-07-13

## 2022-07-12 NOTE — PROGRESS NOTES
PTA medications updated by Medication Scribe prior to surgery via phone call with patient (last doses completed by Nurse)     Medication history sources: Patient, Surescripts and H&P  In the past week, patient estimated taking medication this percent of the time: Greater than 90%  Adherence assessment: N/A Not Observed    Significant changes made to the medication list:  None      Additional medication history information:   None    Medication reconciliation completed by provider prior to medication history? No    Time spent in this activity: 25 minutes    The information provided in this note is only as accurate as the sources available at the time of update(s)    Prior to Admission medications    Medication Sig Last Dose Taking? Auth Provider Long Term End Date   docusate sodium (COLACE) 100 MG capsule Take 1 capsule (100 mg) by mouth 2 times daily as needed for constipation 7/12/2022 at am Yes Sloane Castro MD     ferrous sulfate (FEROSUL) 325 (65 Fe) MG tablet Take 325 mg by mouth daily (with breakfast) 7/4/2022 at pm Yes Reported, Patient     Multiple Vitamin (MULTI-VITAMIN) per tablet Take 1 tablet by mouth daily. 7/4/2022 at pm Yes Livia Montiel MD     MYFEMBREE 40-1-0.5 MG TABS Take 1 tablet by mouth daily 7/12/2022 at am Yes Reported, Patient     metroNIDAZOLE (FLAGYL) 500 MG tablet Take 500 mg by mouth See Admin Instructions Three times day before surgery; at 1pm, 2pm, and 11pm 7/12/2022 at 2300  Reported, Patient     neomycin (MYCIFRADIN) 500 MG tablet Take 1,000 mg by mouth See Admin Instructions Three times day before surgery; at 1pm, 2pm, and 11pm 7/12/2022 at 2300  Reported, Patient       Medication history completed by:    Tanner Mariee CPhT  Medication Scribe  [facility]

## 2022-07-12 NOTE — ANESTHESIA PREPROCEDURE EVALUATION
Anesthesia Pre-Procedure Evaluation    Patient: Aidan Mays   MRN: [id number] : 1980        Procedure : Procedure(s):  exploratory laparotomy ; total abdominal hysterectomy ; right salpingo oophorectomy  exploratory laparotomy ; possible bowel resection ; possible stoma  CYSTOSCOPY,PLACING BILATERAL  URETERAL CATHETERS          Past Medical History:   Diagnosis Date     Endometriosis      Pityriasis rosea 2016     Thyroid nodule 2/3/2011    left complex nodule      Past Surgical History:   Procedure Laterality Date     C/SECTION, LOW TRANSVERSE      twins     FINE NEEDLE ASPIRATION WITHOUT IMAGING GUIDANCE  2011    left thyroid nodule     FRACTURE SURGERY Left     leg     LAPAROTOMY EXPLORATORY N/A 2022    Procedure: Laparotomy exploratory;  Surgeon: Scotty Garcia MD;  Location: [facility] OR      No Known Allergies   Social History     Tobacco Use     Smoking status: Never Smoker     Smokeless tobacco: Never Used   Substance Use Topics     Alcohol use: No     Alcohol/week: 0.0 standard drinks      Wt Readings from Last 1 Encounters:   22 66.2 kg (146 lb)        Anesthesia Evaluation   Pt has had prior anesthetic.     No history of anesthetic complications       ROS/MED HX  ENT/Pulmonary: Comment: Covid + Dec 21   (-) sleep apnea and recent URI   Neurologic:     (+) no peripheral neuropathy  (-) no seizures and migraines   Cardiovascular:  - neg cardiovascular ROS  (-) CHF, orthopnea/PND and arrhythmias   METS/Exercise Tolerance:     Hematologic:  - neg hematologic  ROS     Musculoskeletal:  - neg musculoskeletal ROS     GI/Hepatic:    (-) GERD   Renal/Genitourinary:  - neg Renal ROS     Endo:     (+) thyroid problem,  thryoid nodule,  (-) Type II DM   Psychiatric/Substance Use:  - neg psychiatric ROS     Infectious Disease:  - neg infectious disease ROS     Malignancy:       Other: Comment: Endometriosis   Pelvic adhesions  Fibroid uterus   Chronic pelvic pain  Pelvic congestion  syndrome    S/p ex lap 1/6/2022 - procedure aborted secondary to extensive adhesions           Physical Exam    Airway        Mallampati: II   TM distance: > 3 FB   Neck ROM: full   Mouth opening: > 3 cm    Respiratory Devices and Support         Dental  no notable dental history         Cardiovascular   cardiovascular exam normal       Rhythm and rate: regular and normal     Pulmonary   pulmonary exam normal        breath sounds clear to auscultation           OUTSIDE LABS:  CBC:   Lab Results   Component Value Date    WBC 5.2 07/05/2022    WBC 8.9 08/15/2011    HGB 14.9 07/05/2022    HGB 13.1 01/06/2022    HCT 44.0 07/05/2022    HCT 41.2 08/15/2011     07/05/2022     08/15/2011     BMP:   Lab Results   Component Value Date     07/05/2022    POTASSIUM 4.4 07/05/2022    CHLORIDE 106 07/05/2022    CO2 26 07/05/2022    BUN 11 07/05/2022    CR 0.74 07/05/2022    GLC 95 07/05/2022     COAGS:   Lab Results   Component Value Date    PTT 29 12/19/2010    INR 0.90 12/19/2010    FIBR 612 (H) 12/19/2010     POC:   Lab Results   Component Value Date    HCG Negative 08/20/2009    HCGS Negative 07/05/2022     HEPATIC: No results found for: ALBUMIN, PROTTOTAL, ALT, AST, GGT, ALKPHOS, BILITOTAL, BILIDIRECT, DENIS  OTHER:   Lab Results   Component Value Date    CRISPIN 9.3 07/05/2022    TSH 1.02 02/03/2011       Anesthesia Plan    ASA Status:  2   NPO Status:  NPO Appropriate    Anesthesia Type: General.     - Airway: ETT   Induction: Propofol.   Maintenance: Balanced.   Techniques and Equipment:     - Lines/Monitors: 2nd IV     Consents    Anesthesia Plan(s) and associated risks, benefits, and realistic alternatives discussed. Questions answered and patient/representative(s) expressed understanding.    - Discussed:     - Discussed with:  Patient    Use of blood products discussed: Yes.     - Discussed with: Patient.     - Consented: consented to blood products            Reason for refusal: other.     Postoperative  Care    Pain management: Multi-modal analgesia, Peripheral nerve block (Single Shot).   PONV prophylaxis: Ondansetron (or other 5HT-3), Dexamethasone or Solumedrol, Background Propofol Infusion     Comments:                Dennis Pan MD

## 2022-07-13 ENCOUNTER — HOSPITAL ENCOUNTER (INPATIENT)
Facility: CLINIC | Age: 42
LOS: 3 days | Discharge: HOME | DRG: 743 | End: 2022-07-16
Attending: OBSTETRICS & GYNECOLOGY | Admitting: OBSTETRICS & GYNECOLOGY
Payer: COMMERCIAL

## 2022-07-13 ENCOUNTER — ANESTHESIA (OUTPATIENT)
Dept: SURGERY | Facility: CLINIC | Age: 42
DRG: 743 | End: 2022-07-13
Payer: COMMERCIAL

## 2022-07-13 ENCOUNTER — MEDICAL CORRESPONDENCE (OUTPATIENT)
Dept: ONCOLOGY | Facility: CLINIC | Age: 42
End: 2022-07-13

## 2022-07-13 DIAGNOSIS — N73.6 PELVIC ADHESIONS: Primary | ICD-10-CM

## 2022-07-13 PROBLEM — Z09 SURGERY FOLLOW-UP: Status: ACTIVE | Noted: 2022-07-13

## 2022-07-13 LAB
ABO/RH(D): NORMAL
ANTIBODY SCREEN: NEGATIVE
B-HCG SERPL-ACNC: <1 IU/L (ref 0–5)
CREAT SERPL-MCNC: 0.79 MG/DL (ref 0.52–1.04)
GFR SERPL CREATININE-BSD FRML MDRD: >90 ML/MIN/1.73M2
SPECIMEN EXPIRATION DATE: NORMAL

## 2022-07-13 PROCEDURE — 250N000011 HC RX IP 250 OP 636: Performed by: PHYSICIAN ASSISTANT

## 2022-07-13 PROCEDURE — 88331 PATH CONSLTJ SURG 1 BLK 1SPC: CPT | Mod: 26 | Performed by: PATHOLOGY

## 2022-07-13 PROCEDURE — 0DNU0ZZ RELEASE OMENTUM, OPEN APPROACH: ICD-10-PCS | Performed by: OBSTETRICS & GYNECOLOGY

## 2022-07-13 PROCEDURE — 82565 ASSAY OF CREATININE: CPT | Performed by: PHYSICIAN ASSISTANT

## 2022-07-13 PROCEDURE — 0TQB0ZZ REPAIR BLADDER, OPEN APPROACH: ICD-10-PCS | Performed by: OBSTETRICS & GYNECOLOGY

## 2022-07-13 PROCEDURE — 258N000003 HC RX IP 258 OP 636: Performed by: NURSE ANESTHETIST, CERTIFIED REGISTERED

## 2022-07-13 PROCEDURE — 52005 CYSTO W/URTRL CATHJ: CPT | Performed by: UROLOGY

## 2022-07-13 PROCEDURE — 120N000001 HC R&B MED SURG/OB

## 2022-07-13 PROCEDURE — 86850 RBC ANTIBODY SCREEN: CPT | Performed by: ANESTHESIOLOGY

## 2022-07-13 PROCEDURE — C1765 ADHESION BARRIER: HCPCS | Performed by: OBSTETRICS & GYNECOLOGY

## 2022-07-13 PROCEDURE — 0DNE0ZZ RELEASE LARGE INTESTINE, OPEN APPROACH: ICD-10-PCS | Performed by: OBSTETRICS & GYNECOLOGY

## 2022-07-13 PROCEDURE — 250N000011 HC RX IP 250 OP 636: Performed by: ANESTHESIOLOGY

## 2022-07-13 PROCEDURE — 360N000076 HC SURGERY LEVEL 3, PER MIN: Performed by: OBSTETRICS & GYNECOLOGY

## 2022-07-13 PROCEDURE — 370N000017 HC ANESTHESIA TECHNICAL FEE, PER MIN: Performed by: OBSTETRICS & GYNECOLOGY

## 2022-07-13 PROCEDURE — 0UT00ZZ RESECTION OF RIGHT OVARY, OPEN APPROACH: ICD-10-PCS | Performed by: OBSTETRICS & GYNECOLOGY

## 2022-07-13 PROCEDURE — 84702 CHORIONIC GONADOTROPIN TEST: CPT | Performed by: OBSTETRICS & GYNECOLOGY

## 2022-07-13 PROCEDURE — 272N000001 HC OR GENERAL SUPPLY STERILE: Performed by: OBSTETRICS & GYNECOLOGY

## 2022-07-13 PROCEDURE — 710N000009 HC RECOVERY PHASE 1, LEVEL 1, PER MIN: Performed by: OBSTETRICS & GYNECOLOGY

## 2022-07-13 PROCEDURE — 250N000011 HC RX IP 250 OP 636: Performed by: NURSE ANESTHETIST, CERTIFIED REGISTERED

## 2022-07-13 PROCEDURE — 999N000141 HC STATISTIC PRE-PROCEDURE NURSING ASSESSMENT: Performed by: OBSTETRICS & GYNECOLOGY

## 2022-07-13 PROCEDURE — 0UT70ZZ RESECTION OF BILATERAL FALLOPIAN TUBES, OPEN APPROACH: ICD-10-PCS | Performed by: OBSTETRICS & GYNECOLOGY

## 2022-07-13 PROCEDURE — 88331 PATH CONSLTJ SURG 1 BLK 1SPC: CPT | Mod: TC | Performed by: OBSTETRICS & GYNECOLOGY

## 2022-07-13 PROCEDURE — 250N000013 HC RX MED GY IP 250 OP 250 PS 637: Performed by: OBSTETRICS & GYNECOLOGY

## 2022-07-13 PROCEDURE — 250N000013 HC RX MED GY IP 250 OP 250 PS 637: Performed by: PHYSICIAN ASSISTANT

## 2022-07-13 PROCEDURE — 0T788DZ DILATION OF BILATERAL URETERS WITH INTRALUMINAL DEVICE, VIA NATURAL OR ARTIFICIAL OPENING ENDOSCOPIC: ICD-10-PCS | Performed by: UROLOGY

## 2022-07-13 PROCEDURE — 36415 COLL VENOUS BLD VENIPUNCTURE: CPT | Performed by: PHYSICIAN ASSISTANT

## 2022-07-13 PROCEDURE — 88307 TISSUE EXAM BY PATHOLOGIST: CPT | Mod: 26 | Performed by: PATHOLOGY

## 2022-07-13 PROCEDURE — 250N000009 HC RX 250: Performed by: NURSE ANESTHETIST, CERTIFIED REGISTERED

## 2022-07-13 PROCEDURE — 258N000003 HC RX IP 258 OP 636: Performed by: ANESTHESIOLOGY

## 2022-07-13 PROCEDURE — 258N000003 HC RX IP 258 OP 636: Performed by: PHYSICIAN ASSISTANT

## 2022-07-13 PROCEDURE — 250N000025 HC SEVOFLURANE, PER MIN: Performed by: OBSTETRICS & GYNECOLOGY

## 2022-07-13 PROCEDURE — 0UT90ZZ RESECTION OF UTERUS, OPEN APPROACH: ICD-10-PCS | Performed by: OBSTETRICS & GYNECOLOGY

## 2022-07-13 PROCEDURE — 250N000011 HC RX IP 250 OP 636: Performed by: OBSTETRICS & GYNECOLOGY

## 2022-07-13 RX ORDER — PROCHLORPERAZINE MALEATE 10 MG
10 TABLET ORAL EVERY 6 HOURS PRN
Status: DISCONTINUED | OUTPATIENT
Start: 2022-07-13 | End: 2022-07-16 | Stop reason: HOSPADM

## 2022-07-13 RX ORDER — FENTANYL CITRATE 0.05 MG/ML
50 INJECTION, SOLUTION INTRAMUSCULAR; INTRAVENOUS EVERY 5 MIN PRN
Status: DISCONTINUED | OUTPATIENT
Start: 2022-07-13 | End: 2022-07-13 | Stop reason: HOSPADM

## 2022-07-13 RX ORDER — HYDROMORPHONE HCL IN WATER/PF 6 MG/30 ML
0.4 PATIENT CONTROLLED ANALGESIA SYRINGE INTRAVENOUS EVERY 5 MIN PRN
Status: DISCONTINUED | OUTPATIENT
Start: 2022-07-13 | End: 2022-07-13 | Stop reason: HOSPADM

## 2022-07-13 RX ORDER — LIDOCAINE 40 MG/G
CREAM TOPICAL
Status: DISCONTINUED | OUTPATIENT
Start: 2022-07-13 | End: 2022-07-16 | Stop reason: HOSPADM

## 2022-07-13 RX ORDER — MEPERIDINE HYDROCHLORIDE 25 MG/ML
12.5 INJECTION INTRAMUSCULAR; INTRAVENOUS; SUBCUTANEOUS EVERY 5 MIN PRN
Status: DISCONTINUED | OUTPATIENT
Start: 2022-07-13 | End: 2022-07-13 | Stop reason: HOSPADM

## 2022-07-13 RX ORDER — BUPIVACAINE HYDROCHLORIDE 2.5 MG/ML
INJECTION, SOLUTION EPIDURAL; INFILTRATION; INTRACAUDAL
Status: DISCONTINUED | OUTPATIENT
Start: 2022-07-13 | End: 2022-07-13

## 2022-07-13 RX ORDER — DEXAMETHASONE SODIUM PHOSPHATE 4 MG/ML
INJECTION, SOLUTION INTRA-ARTICULAR; INTRALESIONAL; INTRAMUSCULAR; INTRAVENOUS; SOFT TISSUE PRN
Status: DISCONTINUED | OUTPATIENT
Start: 2022-07-13 | End: 2022-07-13

## 2022-07-13 RX ORDER — IBUPROFEN 400 MG/1
800 TABLET, FILM COATED ORAL EVERY 6 HOURS
Status: DISCONTINUED | OUTPATIENT
Start: 2022-07-13 | End: 2022-07-16 | Stop reason: HOSPADM

## 2022-07-13 RX ORDER — NALOXONE HYDROCHLORIDE 0.4 MG/ML
0.4 INJECTION, SOLUTION INTRAMUSCULAR; INTRAVENOUS; SUBCUTANEOUS
Status: DISCONTINUED | OUTPATIENT
Start: 2022-07-13 | End: 2022-07-16 | Stop reason: HOSPADM

## 2022-07-13 RX ORDER — HYDROMORPHONE HCL IN WATER/PF 6 MG/30 ML
0.4 PATIENT CONTROLLED ANALGESIA SYRINGE INTRAVENOUS
Status: DISCONTINUED | OUTPATIENT
Start: 2022-07-13 | End: 2022-07-16 | Stop reason: HOSPADM

## 2022-07-13 RX ORDER — FENTANYL CITRATE 50 UG/ML
INJECTION, SOLUTION INTRAMUSCULAR; INTRAVENOUS PRN
Status: DISCONTINUED | OUTPATIENT
Start: 2022-07-13 | End: 2022-07-13

## 2022-07-13 RX ORDER — CIPROFLOXACIN 500 MG/1
500 TABLET, FILM COATED ORAL EVERY 12 HOURS SCHEDULED
Status: DISCONTINUED | OUTPATIENT
Start: 2022-07-14 | End: 2022-07-16 | Stop reason: HOSPADM

## 2022-07-13 RX ORDER — ESTRADIOL 1 MG/1
1 TABLET ORAL DAILY
Status: DISCONTINUED | OUTPATIENT
Start: 2022-07-14 | End: 2022-07-16 | Stop reason: HOSPADM

## 2022-07-13 RX ORDER — ACETAMINOPHEN 325 MG/1
650 TABLET ORAL EVERY 6 HOURS
Status: DISCONTINUED | OUTPATIENT
Start: 2022-07-16 | End: 2022-07-16 | Stop reason: HOSPADM

## 2022-07-13 RX ORDER — PIPERACILLIN SODIUM, TAZOBACTAM SODIUM 3; .375 G/15ML; G/15ML
3.38 INJECTION, POWDER, LYOPHILIZED, FOR SOLUTION INTRAVENOUS EVERY 6 HOURS
Status: COMPLETED | OUTPATIENT
Start: 2022-07-13 | End: 2022-07-13

## 2022-07-13 RX ORDER — KETOROLAC TROMETHAMINE 15 MG/ML
15 INJECTION, SOLUTION INTRAMUSCULAR; INTRAVENOUS EVERY 6 HOURS
Status: DISCONTINUED | OUTPATIENT
Start: 2022-07-13 | End: 2022-07-16 | Stop reason: HOSPADM

## 2022-07-13 RX ORDER — ONDANSETRON 2 MG/ML
4 INJECTION INTRAMUSCULAR; INTRAVENOUS EVERY 6 HOURS PRN
Status: DISCONTINUED | OUTPATIENT
Start: 2022-07-13 | End: 2022-07-16 | Stop reason: HOSPADM

## 2022-07-13 RX ORDER — SODIUM CHLORIDE, SODIUM LACTATE, POTASSIUM CHLORIDE, CALCIUM CHLORIDE 600; 310; 30; 20 MG/100ML; MG/100ML; MG/100ML; MG/100ML
INJECTION, SOLUTION INTRAVENOUS CONTINUOUS
Status: DISCONTINUED | OUTPATIENT
Start: 2022-07-13 | End: 2022-07-13 | Stop reason: HOSPADM

## 2022-07-13 RX ORDER — ACETAMINOPHEN 325 MG/1
975 TABLET ORAL EVERY 6 HOURS
Status: DISCONTINUED | OUTPATIENT
Start: 2022-07-13 | End: 2022-07-16 | Stop reason: HOSPADM

## 2022-07-13 RX ORDER — ONDANSETRON 2 MG/ML
INJECTION INTRAMUSCULAR; INTRAVENOUS PRN
Status: DISCONTINUED | OUTPATIENT
Start: 2022-07-13 | End: 2022-07-13

## 2022-07-13 RX ORDER — VECURONIUM BROMIDE 1 MG/ML
INJECTION, POWDER, LYOPHILIZED, FOR SOLUTION INTRAVENOUS PRN
Status: DISCONTINUED | OUTPATIENT
Start: 2022-07-13 | End: 2022-07-13

## 2022-07-13 RX ORDER — OXYCODONE HYDROCHLORIDE 5 MG/1
10 TABLET ORAL EVERY 4 HOURS PRN
Status: DISCONTINUED | OUTPATIENT
Start: 2022-07-13 | End: 2022-07-16 | Stop reason: HOSPADM

## 2022-07-13 RX ORDER — OXYCODONE HYDROCHLORIDE 5 MG/1
5 TABLET ORAL EVERY 4 HOURS PRN
Status: CANCELLED | OUTPATIENT
Start: 2022-07-13

## 2022-07-13 RX ORDER — ONDANSETRON 4 MG/1
4 TABLET, ORALLY DISINTEGRATING ORAL EVERY 6 HOURS PRN
Status: DISCONTINUED | OUTPATIENT
Start: 2022-07-13 | End: 2022-07-16 | Stop reason: HOSPADM

## 2022-07-13 RX ORDER — POLYETHYLENE GLYCOL 3350 17 G/17G
17 POWDER, FOR SOLUTION ORAL DAILY
Status: DISCONTINUED | OUTPATIENT
Start: 2022-07-14 | End: 2022-07-16 | Stop reason: HOSPADM

## 2022-07-13 RX ORDER — SIMETHICONE 80 MG
80 TABLET,CHEWABLE ORAL EVERY 6 HOURS PRN
Status: DISCONTINUED | OUTPATIENT
Start: 2022-07-13 | End: 2022-07-16 | Stop reason: HOSPADM

## 2022-07-13 RX ORDER — PROPOFOL 10 MG/ML
INJECTION, EMULSION INTRAVENOUS PRN
Status: DISCONTINUED | OUTPATIENT
Start: 2022-07-13 | End: 2022-07-13

## 2022-07-13 RX ORDER — AMOXICILLIN 250 MG
1 CAPSULE ORAL 2 TIMES DAILY
Status: DISCONTINUED | OUTPATIENT
Start: 2022-07-13 | End: 2022-07-16 | Stop reason: HOSPADM

## 2022-07-13 RX ORDER — HYDROMORPHONE HCL IN WATER/PF 6 MG/30 ML
0.2 PATIENT CONTROLLED ANALGESIA SYRINGE INTRAVENOUS
Status: DISCONTINUED | OUTPATIENT
Start: 2022-07-13 | End: 2022-07-16 | Stop reason: HOSPADM

## 2022-07-13 RX ORDER — EPHEDRINE SULFATE 50 MG/ML
INJECTION, SOLUTION INTRAMUSCULAR; INTRAVENOUS; SUBCUTANEOUS PRN
Status: DISCONTINUED | OUTPATIENT
Start: 2022-07-13 | End: 2022-07-13

## 2022-07-13 RX ORDER — CALCIUM CARBONATE 500 MG/1
500 TABLET, CHEWABLE ORAL 4 TIMES DAILY PRN
Status: DISCONTINUED | OUTPATIENT
Start: 2022-07-13 | End: 2022-07-16 | Stop reason: HOSPADM

## 2022-07-13 RX ORDER — ESTRADIOL 1 MG/1
1 TABLET ORAL DAILY
Qty: 30 TABLET | Refills: 12 | Status: SHIPPED | OUTPATIENT
Start: 2022-07-14

## 2022-07-13 RX ORDER — PROPOFOL 10 MG/ML
INJECTION, EMULSION INTRAVENOUS CONTINUOUS PRN
Status: DISCONTINUED | OUTPATIENT
Start: 2022-07-13 | End: 2022-07-13

## 2022-07-13 RX ORDER — ONDANSETRON 4 MG/1
4 TABLET, ORALLY DISINTEGRATING ORAL EVERY 30 MIN PRN
Status: DISCONTINUED | OUTPATIENT
Start: 2022-07-13 | End: 2022-07-13 | Stop reason: HOSPADM

## 2022-07-13 RX ORDER — OXYCODONE HYDROCHLORIDE 5 MG/1
5 TABLET ORAL EVERY 4 HOURS PRN
Status: DISCONTINUED | OUTPATIENT
Start: 2022-07-13 | End: 2022-07-16 | Stop reason: HOSPADM

## 2022-07-13 RX ORDER — NALOXONE HYDROCHLORIDE 0.4 MG/ML
0.2 INJECTION, SOLUTION INTRAMUSCULAR; INTRAVENOUS; SUBCUTANEOUS
Status: DISCONTINUED | OUTPATIENT
Start: 2022-07-13 | End: 2022-07-16 | Stop reason: HOSPADM

## 2022-07-13 RX ORDER — SODIUM CHLORIDE, SODIUM LACTATE, POTASSIUM CHLORIDE, CALCIUM CHLORIDE 600; 310; 30; 20 MG/100ML; MG/100ML; MG/100ML; MG/100ML
INJECTION, SOLUTION INTRAVENOUS CONTINUOUS PRN
Status: DISCONTINUED | OUTPATIENT
Start: 2022-07-13 | End: 2022-07-13

## 2022-07-13 RX ORDER — SODIUM CHLORIDE 9 MG/ML
INJECTION, SOLUTION INTRAVENOUS CONTINUOUS
Status: DISCONTINUED | OUTPATIENT
Start: 2022-07-13 | End: 2022-07-15

## 2022-07-13 RX ORDER — LIDOCAINE HYDROCHLORIDE 20 MG/ML
INJECTION, SOLUTION INFILTRATION; PERINEURAL PRN
Status: DISCONTINUED | OUTPATIENT
Start: 2022-07-13 | End: 2022-07-13

## 2022-07-13 RX ORDER — CEFAZOLIN SODIUM/WATER 2 G/20 ML
2 SYRINGE (ML) INTRAVENOUS
Status: COMPLETED | OUTPATIENT
Start: 2022-07-13 | End: 2022-07-13

## 2022-07-13 RX ORDER — ONDANSETRON 2 MG/ML
4 INJECTION INTRAMUSCULAR; INTRAVENOUS EVERY 30 MIN PRN
Status: DISCONTINUED | OUTPATIENT
Start: 2022-07-13 | End: 2022-07-13 | Stop reason: HOSPADM

## 2022-07-13 RX ORDER — CEFAZOLIN SODIUM/WATER 2 G/20 ML
2 SYRINGE (ML) INTRAVENOUS SEE ADMIN INSTRUCTIONS
Status: DISCONTINUED | OUTPATIENT
Start: 2022-07-13 | End: 2022-07-13 | Stop reason: HOSPADM

## 2022-07-13 RX ORDER — ENOXAPARIN SODIUM 100 MG/ML
40 INJECTION SUBCUTANEOUS EVERY 24 HOURS
Status: DISCONTINUED | OUTPATIENT
Start: 2022-07-14 | End: 2022-07-16 | Stop reason: HOSPADM

## 2022-07-13 RX ORDER — ACETAMINOPHEN 325 MG/1
975 TABLET ORAL ONCE
Status: COMPLETED | OUTPATIENT
Start: 2022-07-13 | End: 2022-07-13

## 2022-07-13 RX ADMIN — DEXMEDETOMIDINE HYDROCHLORIDE 12 MCG: 100 INJECTION, SOLUTION INTRAVENOUS at 08:30

## 2022-07-13 RX ADMIN — IBUPROFEN 800 MG: 400 TABLET, FILM COATED ORAL at 18:48

## 2022-07-13 RX ADMIN — SODIUM CHLORIDE: 9 INJECTION, SOLUTION INTRAVENOUS at 13:30

## 2022-07-13 RX ADMIN — Medication 2 G: at 07:42

## 2022-07-13 RX ADMIN — PIPERACILLIN AND TAZOBACTAM 3.38 G: 3; .375 INJECTION, POWDER, FOR SOLUTION INTRAVENOUS at 13:14

## 2022-07-13 RX ADMIN — HYDROMORPHONE HYDROCHLORIDE 0.2 MG: 0.2 INJECTION, SOLUTION INTRAMUSCULAR; INTRAVENOUS; SUBCUTANEOUS at 13:08

## 2022-07-13 RX ADMIN — HYDROMORPHONE HYDROCHLORIDE 0.5 MG: 1 INJECTION, SOLUTION INTRAMUSCULAR; INTRAVENOUS; SUBCUTANEOUS at 08:25

## 2022-07-13 RX ADMIN — SUGAMMADEX 200 MG: 100 INJECTION, SOLUTION INTRAVENOUS at 10:21

## 2022-07-13 RX ADMIN — MIDAZOLAM 2 MG: 1 INJECTION INTRAMUSCULAR; INTRAVENOUS at 07:42

## 2022-07-13 RX ADMIN — SODIUM CHLORIDE, POTASSIUM CHLORIDE, SODIUM LACTATE AND CALCIUM CHLORIDE: 600; 310; 30; 20 INJECTION, SOLUTION INTRAVENOUS at 07:41

## 2022-07-13 RX ADMIN — ACETAMINOPHEN 975 MG: 325 TABLET ORAL at 18:48

## 2022-07-13 RX ADMIN — PROPOFOL 100 MCG/KG/MIN: 10 INJECTION, EMULSION INTRAVENOUS at 07:55

## 2022-07-13 RX ADMIN — ROCURONIUM BROMIDE 40 MG: 50 INJECTION, SOLUTION INTRAVENOUS at 07:50

## 2022-07-13 RX ADMIN — OXYCODONE HYDROCHLORIDE 5 MG: 5 TABLET ORAL at 14:34

## 2022-07-13 RX ADMIN — DEXAMETHASONE SODIUM PHOSPHATE 4 MG: 4 INJECTION, SOLUTION INTRA-ARTICULAR; INTRALESIONAL; INTRAMUSCULAR; INTRAVENOUS; SOFT TISSUE at 08:00

## 2022-07-13 RX ADMIN — DEXMEDETOMIDINE HYDROCHLORIDE 8 MCG: 100 INJECTION, SOLUTION INTRAVENOUS at 08:39

## 2022-07-13 RX ADMIN — PROPOFOL 150 MG: 10 INJECTION, EMULSION INTRAVENOUS at 07:50

## 2022-07-13 RX ADMIN — SENNOSIDES AND DOCUSATE SODIUM 1 TABLET: 50; 8.6 TABLET ORAL at 14:28

## 2022-07-13 RX ADMIN — HYDROMORPHONE HYDROCHLORIDE 0.4 MG: 0.2 INJECTION, SOLUTION INTRAMUSCULAR; INTRAVENOUS; SUBCUTANEOUS at 17:24

## 2022-07-13 RX ADMIN — VECURONIUM BROMIDE 2 MG: 1 INJECTION, POWDER, LYOPHILIZED, FOR SOLUTION INTRAVENOUS at 10:01

## 2022-07-13 RX ADMIN — ROCURONIUM BROMIDE 10 MG: 50 INJECTION, SOLUTION INTRAVENOUS at 08:30

## 2022-07-13 RX ADMIN — ACETAMINOPHEN 975 MG: 325 TABLET ORAL at 06:32

## 2022-07-13 RX ADMIN — PIPERACILLIN AND TAZOBACTAM 3.38 G: 3; .375 INJECTION, POWDER, FOR SOLUTION INTRAVENOUS at 20:39

## 2022-07-13 RX ADMIN — HYDROMORPHONE HYDROCHLORIDE 0.2 MG: 0.2 INJECTION, SOLUTION INTRAMUSCULAR; INTRAVENOUS; SUBCUTANEOUS at 16:16

## 2022-07-13 RX ADMIN — HYDROMORPHONE HYDROCHLORIDE 0.4 MG: 0.2 INJECTION, SOLUTION INTRAMUSCULAR; INTRAVENOUS; SUBCUTANEOUS at 23:09

## 2022-07-13 RX ADMIN — BUPIVACAINE HYDROCHLORIDE 40 ML: 2.5 INJECTION, SOLUTION EPIDURAL; INFILTRATION; INTRACAUDAL; PERINEURAL at 10:00

## 2022-07-13 RX ADMIN — VECURONIUM BROMIDE 2 MG: 1 INJECTION, POWDER, LYOPHILIZED, FOR SOLUTION INTRAVENOUS at 09:10

## 2022-07-13 RX ADMIN — FENTANYL CITRATE 100 MCG: 50 INJECTION, SOLUTION INTRAMUSCULAR; INTRAVENOUS at 07:50

## 2022-07-13 RX ADMIN — Medication 5 MG: at 09:39

## 2022-07-13 RX ADMIN — LIDOCAINE HYDROCHLORIDE 100 MG: 20 INJECTION, SOLUTION INFILTRATION; PERINEURAL at 07:50

## 2022-07-13 RX ADMIN — HYDROMORPHONE HYDROCHLORIDE 0.4 MG: 0.2 INJECTION, SOLUTION INTRAMUSCULAR; INTRAVENOUS; SUBCUTANEOUS at 20:43

## 2022-07-13 RX ADMIN — ONDANSETRON 4 MG: 2 INJECTION INTRAMUSCULAR; INTRAVENOUS at 08:00

## 2022-07-13 ASSESSMENT — ACTIVITIES OF DAILY LIVING (ADL)
ADLS_ACUITY_SCORE: 18
ADLS_ACUITY_SCORE: 20
FALL_HISTORY_WITHIN_LAST_SIX_MONTHS: NO
ADLS_ACUITY_SCORE: 18
ADLS_ACUITY_SCORE: 20
ADLS_ACUITY_SCORE: 20
TOILETING_ISSUES: NO
ADLS_ACUITY_SCORE: 20
ADLS_ACUITY_SCORE: 18

## 2022-07-13 ASSESSMENT — ENCOUNTER SYMPTOMS
DYSRHYTHMIAS: 0
ORTHOPNEA: 0
SEIZURES: 0

## 2022-07-13 NOTE — INTERVAL H&P NOTE
"I have reviewed the surgical (or preoperative) H&P that is linked to this encounter, and examined the patient. There are no significant changes    Clinical Conditions Present on Arrival:  Clinically Significant Risk Factors Present on Admission                   # Overweight: Estimated body mass index is 25.33 kg/m  as calculated from the following:    Height as of this encounter: 1.6 m (5' 3\").    Weight as of this encounter: 64.9 kg (143 lb).       "

## 2022-07-13 NOTE — ANESTHESIA PROCEDURE NOTES
TAP Procedure Note    Pre-Procedure   Staff -        Anesthesiologist:  Dennis Pan MD       Performed By: Anesthesiologist       Location: pre-op       Pre-Anesthestic Checklist: patient identified, IV checked, site marked, risks and benefits discussed, informed consent, monitors and equipment checked, pre-op evaluation, at physician/surgeon's request and post-op pain management  Timeout:       Correct Patient: Yes        Correct Procedure: Yes        Correct Site: Yes        Correct Position: Yes        Correct Laterality: Yes        Site Marked: Yes  Procedure Documentation  Procedure: TAP       Laterality: bilateral       Patient Position: supine       Skin prep: Chloraprep       Local skin infiltrated with 3 mL of.        Needle Type: insulated and short bevel       Needle Gauge: 21.        Needle Length (millimeters): 100        Ultrasound guided       1. Ultrasound was used to identify targeted nerve, plexus, vascular marker, or fascial plane and place a needle adjacent to it in real-time.       2. Ultrasound was used to visualize the spread of anesthetic in close proximity to the above referenced structure.       3. A permanent image is entered into the patient's record.       4. The visualized anatomic structures appeared normal.       5. There were no apparent abnormal pathologic findings.    Assessment/Narrative         The placement was negative for: blood aspirated, painful injection and site bleeding       Paresthesias: No.       Bolus given via needle. no blood aspirated via catheter.        Secured via.        Insertion/Infusion Method: Single Shot       Complications: none    Medication(s) Administered   Bupivacaine 0.25% PF (Infiltration) - Infiltration   40 mL - 7/13/2022 10:00:00 AM   Comments:  Ultrasound Interpretation, Peripheral Nerve Block    1. Under ultrasound guidance, the needle was inserted and placed in close proximity to the target nerve(s).  2. Ultrasound was also used to  visualize the spread of the anesthetic in close proximity to the nerve(s) being blocked.  Local anesthetic was administered in incremental doses, with intermittent negative aspiration.    3. The nerve(s) appeared anatomically normal.  4. There were no apparent abnormal pathological findings.  5. A permanent ultrasound image was saved in the patient's record.    Bilateral TAP blocks placed intraoperatively in operating room prior to extubating patient     Pt tolerated well.    No complications.      The surgeon has given a verbal order transferring care of this patient to me for the performance of a regional analgesia block for post-op pain control. It is requested of me because I am uniquely trained and qualified to perform this block and the surgeon is neither trained nor qualified to perform this procedure.

## 2022-07-13 NOTE — ANESTHESIA PROCEDURE NOTES
Airway       Patient location during procedure: OR       Procedure Start/Stop Times: 7/13/2022 7:52 AM  Staff -        Anesthesiologist:  Dennis Pan MD       CRNA: Rita Rai APRN CRNA       Performed By: CRNAIndications and Patient Condition       Indications for airway management: rose-procedural       Induction type:intravenous       Mask difficulty assessment: 1 - vent by mask    Final Airway Details       Final airway type: endotracheal airway       Successful airway: ETT - single  Endotracheal Airway Details        ETT size (mm): 7.0       Cuffed: yes       Successful intubation technique: direct laryngoscopy       DL Blade Type: MAC 3       Grade View of Cords: 1       Adjucts: stylet       Position: Right       Measured from: lips       Secured at (cm): 21       Bite block used: None    Post intubation assessment        Placement verified by: capnometry, equal breath sounds and chest rise        Number of attempts at approach: 1       Secured with: pink tape       Ease of procedure: easy       Dentition: Intact and Unchanged (Chipped Right front tooth prior to airway manipulation)    Medication(s) Administered   Medication Administration Time: 7/13/2022 7:52 AM

## 2022-07-13 NOTE — ANESTHESIA CARE TRANSFER NOTE
Patient: Aidan Mays    Procedure: Procedure(s):  exploratory laparotomy ; total abdominal hysterectomy ; right salpingo oophorectomy, SUTURE REPAIR OF THE BLADDER, and removal of ureteral catheters, lysis of adhesions, bilateral ureterolysis  CYSTOSCOPY,PLACING BILATERAL  URETERAL CATHETERS       Diagnosis: Fibroid uterus [D25.9]  Pelvic congestion syndrome [N94.89]  Diagnosis Additional Information: No value filed.    Anesthesia Type:   General     Note:    Oropharynx: oropharynx clear of all foreign objects and spontaneously breathing  Level of Consciousness: drowsy  Oxygen Supplementation: face mask  Level of Supplemental Oxygen (L/min / FiO2): 8  Independent Airway: airway patency satisfactory and stable  Dentition: dentition unchanged  Vital Signs Stable: post-procedure vital signs reviewed and stable  Report to RN Given: handoff report given  Patient transferred to: PACU    Handoff Report: Identifed the Patient, Identified the Reponsible Provider, Reviewed the pertinent medical history, Discussed the surgical course, Reviewed Intra-OP anesthesia mangement and issues during anesthesia, Set expectations for post-procedure period and Allowed opportunity for questions and acknowledgement of understanding      Vitals:  Vitals Value Taken Time   BP     Temp     Pulse 93 07/13/22 1050   Resp 19 07/13/22 1050   SpO2 100 % 07/13/22 1050   Vitals shown include unvalidated device data.    Electronically Signed By: MARK Lassiter CRNA  July 13, 2022  10:51 AM

## 2022-07-13 NOTE — BRIEF OP NOTE
[facility]    Brief Operative Note    Pre-operative diagnosis: Fibroid uterus [D25.9]  Pelvic congestion syndrome [N94.89]  Post-operative diagnosis Same as pre-operative diagnosis    Procedure: Procedure(s):  exploratory laparotomy ; total abdominal hysterectomy ; right salpingo oophorectomy, SUTURE REPAIR OF THE BLADDER  CYSTOSCOPY,PLACING BILATERAL  URETERAL CATHETERS  Surgeon: Surgeon(s) and Role:  Panel 1:     * Jennifer Sanchez MD - Primary  Panel 2:     * Edouard Cummins MD - Primary   Kennedy Christianson MD  Anesthesia: General   Estimated Blood Loss: 100mL    Drains: None  Specimens:   ID Type Source Tests Collected by Time Destination   1 : UTERUS, CERVIX, RIGHT FALLOPIAN TUBE AND OVARY, LEFT FALLOPIAN TUBE Tissue Uterus, Cervix, Bilateral Fallopian Tubes SURGICAL PATHOLOGY EXAM Jennifer Sanchez MD 7/13/2022  9:44 AM      Findings:   Enlarged uterus with multiple fibroids, left ovary surgically absent. Right hydrosalpinx. Dense adhesions from utuerus to bladder, small intestine, and sigmoid colon. Bladder was retrograde filled, small serosal tears repaired. Rigid proctoscopy revealed a small superficial serosal tear on the sigmoid colon. Initial pathology demonstrated no lesions on the endometrium, frozen pathology consistent with leiomyomas.  Complications: None.  Implants: * No implants in log *

## 2022-07-13 NOTE — OP NOTE
OPERATIVE REPORT  DATE OF SURGERY: 07/13/22  LOCATION OF SURGERY: [facility]  PREOPERATIVE DIAGNOSIS:  (N73.6) Pelvic adhesions  (primary encounter diagnosis)  POSTOPERATIVE DIAGNOSIS: (N73.6) Pelvic adhesions  (primary encounter diagnosis)     START TIME: 8:08 AM  END TIME: 8:15 AM (Urology portion)    PROCEDURE PERFORMED:   1. Cystoscopy  2. Bilateral ureteral catheter placement     STAFF SURGEON: Edouard Cummins MD  ANESTHESIA: General.   ESTIMATED BLOOD LOSS: 0 mL (Urology portion)  DRAINS AND TUBES: Bilateral 5 Tajik open-ended catheter was removed at the end of the case, 16 Tajik Perez catheter  COMPLICATIONS: None.   DISPOSITION: PACU.   SPECIMENS OBTAINED: None (urology portion)  SIGNIFICANT FINDINGS: Cystoscopy with no evidence of intravesical abnormalities.  Bilateral ureteral catheters placed without complication.     HISTORY OF PRESENT ILLNESS: Aidan Mays is a 41 year old female with severe endometriosis undergoing open GYN/CORS surgery with Dr. Sanchez.  Urology assistance requested for bilateral ureteral catheter placement    OPERATION PERFORMED:   Informed consent was obtained and the patient was brought to the operating room where general anesthesia was induced. The patient was given appropriate preoperative antibiotics and positioned supine. The patient was then repositioned in dorsal lithotomy with all pressure points padded. We then performed a timeout, verifying the correct patient's site and procedure to be performed.    A 22 Tajik cystoscope was inserted atraumatically into the bladder.  Complete cystoscopy was performed with no evidence of intravesical abnormalities.  Left ureteral orifice was identified and cannulated with a 5 Tajik open-ended catheter which was advanced up to the 20 cm sebastián without any evidence of resistance.  Similarly, the right ureteral orifice was identified and cannulated with a 5 Tajik open-ended catheter which was advanced up to the 20 cm sebastián with no  notable resistance.  The cystoscope was removed and a 16 Maori Perez catheter was placed.  The ureteral catheters were secured to the Perez catheter with a Goldberg adapter as well as 0 silk ties.  The case was then turned over to Dr. Sanchez, please see separate operative report.    Edouard Cummins MD   Urology  [facility]  Clinic Phone [phone]

## 2022-07-13 NOTE — OP NOTE
Procedure Date: 07/13/2022    PREOPERATIVE DIAGNOSIS:  Uterine fibroids, frozen pelvis.    POSTOPERATIVE DIAGNOSIS:  Uterine fibroids, frozen pelvis.    SURGEON:  Jennifer Sanchez MD    ASSISTANT:  Dayami Rojo PA-C    CO-SURGEON:  Dr. Brandt Myers and Dr. Edouard Cummins.    PROCEDURES:  Exploratory laparotomy, total abdominal hysterectomy, right salpingo-oophorectomy, left salpingectomy, extensive lysis of adhesions, bilateral ureterolysis, suture repair of bladder.    INDICATIONS FOR PROCEDURE:  The patient is a 41-year-old female with a history of fibroids, endometriosis and a right ovarian mass.  She underwent a pelvic ultrasound 02/09/2021, which revealed an enlarged uterus with 3 fibroids.  Right ovary had an anechoic cyst with septations measuring 8.3 x 6.1 x 6 cm.  Left ovary was surgically absent.  She was started on Lupron therapy.  With Lupron therapy, she did have some shrinkage of her fibroids on subsequent ultrasound done 09/10/2021.  By 11/12/2021, she had stabilization of the fibroids by that time.  They were still fairly large.  Dr. 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  Findings included a posterior cul-de-sac that was obliterated.  They were unable to mobilize the uterus.  The right adnexa had a large fluid-filled cyst that was peritonealized and indistinct from the uterus.  They were unable to identify the broad ligament due to the bulky fixed fibroid uterus, and ureters were unable to be identified or palpated.  I was consulted intraoperatively, and we, in combination, decided that they should abort the procedure.  The patient was subsequently seen in consultation in my office.  Because of the frozen pelvis, I did reschedule her surgery after she has been seen by Dr. Myers, and Dr. Edouard Cummins had also been asked, from Urology, to put ureteral stents in place.    FINDINGS:   The patient did have a completely obliterated pelvis.  There were no readily identifiable structures.  Her bladder was densely stuck to the anterior fibroid.  Her ureters coursed very closely to the uterus.  The posterior aspect of the uterus was obliterated by adhesions.  She also had multiple filmy adhesions between multiple loops of bowel suggestive of some kind of inflammatory process at some point in her life.  It did require extensive lysis of adhesions and probably at least an hour of adhesiolysis to free up the bowel from the uterus.  On frozen section, she was found to have just an appearance of benign fibroids and a hydrosalpinx.    PROCEDURE IN DETAIL:  The patient was taken to the operating room.  She received broad spectrum antibiotic prophylaxis.  Knee high sequential compression devices were placed on her lower extremities.  She was brought to the operating room and placed in a supine position.  General endotracheal anesthesia was administered in the usual fashion.  Once intubated, she was repositioned in low lithotomy position using well-padded Grzegorz stirrups.  Her arms were left on arm boards and well padded as well.  She was prepped and draped for Dr. Cummins's part of the procedure and you can refer to his portion of the procedure.  After the Perez and ureteral stents had been placed, she was prepped and draped again for the open procedure.  A timeout was conducted for this portion of the procedure and everyone agreed upon the procedure.  I started by making a midline vertical incision from the symphysis pubis around the right side of the umbilicus, and this was taken down through the subcutaneous tissue and through the fascia with electrocautery.  The muscles were divided in the midline.  Posterior rectus sheath was opened, and the peritoneal cavity was opened, and the incision was extended superiorly and inferiorly.  You could tell right away that there was no mobility in the uterus whatsoever.  I  placed a Bookwalter retractor around the incision.  I used lateral and inferior retractor blades to optimize visualization as much as I could.  We packed the bowel away with moist laps superiorly.  I took down some adhesions of the small bowel to the anterior abdominal wall in between loops of bowel.  I then opened up the posterior broad ligament over the psoas muscle, and we started doing extensive lysis of adhesions.  Initially you could not identify her adnexal structures because they were adherent to bowel loops.  It took at least 45 minutes to 60 minutes of doing extensive lysis of adhesions to free up all the adhesions, and as we were freeing them up for the adnexa, we identified the blood supply.  We made sure the stent was not included in this and we cauterized and divided them with the LigaSure device.  We undercut the peritoneum towards the uterus as much as we could.  We tagged both ureters with vessel loops and dissected these out from the lateral aspect of the uterus.  This was a little easier on the right side.   We continued to lyse adhesions to free up the posterior aspect of the colon.  Her anatomy anteriorly was also extremely skewed.  We opened up the peritoneum as much as we could.  I could find a space where I could move the bladder off from the left hand side of the anterior fibroid.  It was very difficult to tell her uterus from where the bladder fibers were densely adherent to it.  We continued to dissect out these structures, and once we were able to mobilize the whole uterus up posteriorly and away from the rectosigmoid with Dr. Myers's help, I could find vessels on either side above the ureter and these were clamped off with curved Zeppelin clamps, cauterized and divided with the LigaSure device and then suture ligated with 0 Vicryl suture on a CT2 needle.  I was then able to identify the cervix, and once we were able to get the bladder off of the cervix, we came down the cardinal  ligaments, cauterizing and dividing the soft tissues free of the cervix using straight Zeppelins.  The tissues medial to the clamp were cauterized with the LigaSure device, divided with a scalpel blade and suture ligated with 0 Vicryl suture in a transfixing fashion on a CT2 needle.  We continued this down the entire cervix, making sure the rectosigmoid was down posteriorly and the bladder was down anteriorly.  We came across the upper vagina with two gently curved Zeppelins and I used Carlitos scissors to amputate the cervix free.  The angles of the vagina were secured with 0 Vicryl suture in a transfixing fashion using a CT2 needle.  The intervening anterior and posterior aspects of the vagina were secured with 0 Vicryl suture in a figure-of-eight fashion.  During the procedure, I had Dayami Rojo place approximately 180 mL of fluid in the bladder, which she was able to do.  I then had her fill it more to 240 mL, and at that point, we could see two areas, one where the bladder had been thinned out to the mucosa, but we had not breached the mucosa.  This area was oversewn with 3-0 Vicryl suture in a running fashion.  I did a double layer closure with the first layer to close the muscularis and the second one to imbricate the serosa over the first layer, and these were both done with 3-0 Vicryls on an SH needle.  There was an area where there appeared to be some myometrium that had been taken with the bladder and the myometrium was freed up from that area and then I oversewed that area of the bladder as well.  Dr. Brandt Myers performed a rigid sigmoidoscopy, testing the colon and there was no leak.  He did find one area that needed to be oversewn with a 3-0 Vicryl suture on an SH, and this will be dictated separately by him.  We irrigated the pelvis copiously with water.  When I felt hemostasis had been obtained, I placed Vickey in the pelvis and then we used Seprafilm over that to prevent adhesion formation.  I  did further lysis of adhesions between loops of bowel and then put Seprafilm over the bowel underneath the incision.  Once the laps and sponge counts were correct and needle counts were correct, we closed the abdomen in a mass closure using 0 looped PDS suture from superior and inferior aspects towards the midline where each was tied to itself.  The subcutaneous tissue was irrigated, and the skin was reapproximated with staples.  Estimated blood loss for the procedure, 500 mL.    Dayami Rojo was an assist.  She helped with cutting suture and suctioning during the case and helped me with opening and closing of the abdomen.  Please note that this was an extremely difficult hysterectomy, requiring all three surgical services to be present to perform it.  The total operative time was 2-1/2 hours.  This was difficult because of the degree of adhesions and the obscured anatomy.  At the end of the case, I did remove the ureteral stents without difficulty.  Anesthesia was called to the room and placed a TAP block.  Once that was done, her anesthesia was reversed.  She was extubated and taken to recovery room in stable condition.    Jennifer Sanchez MD        D: 2022   T: 2022   MT: DOT/ADDY    Name:     ESTER ZAMAN  MRN:      [id number]        Account:        [id number]   :      1980           Procedure Date: 2022     Document: [id number]    cc:  MD Brandt Zuñiga MD Bryan D. Hinck, MD

## 2022-07-13 NOTE — PLAN OF CARE
Goal Outcome Evaluation:    Orientation: A&Ox4  Aggression Stop Light: green  Mobility: sba-due to sedation, not OOB, encouraged to dangled at the side of the bed  Pain Management: dilaudid prn  Diet:full liquid-tolerated- ADAT  Bowel/Bladder: alcantara-draining dark blood tinged urine  Drain/Device/Wound: abd incision     D/C Day/Goals/Place: possible 3 day stay    Shift Note:   A&Ox4. Fall risk. Abd pain-dilaudid and oxycodone given. 0.4 mg of Dilaudid much more effective. Ice applied to abd incision. Abd binder on, dressing has bloody drainage thought-monitoring. Using IS. Receiving IV fluids and abx. Senna given. PCDS on. Spouse is at the bedside.                       Morning assessment complete. Meds crushed in applesauce, tolerated well. VSS, pt on room air at this time but noted rhonchi to lungs. Encouraged IS use. Potassium replacement given per order. Dr. Alexandro Spaulding notified about pt's HR dropping into 30's overnight, stated he would consult cardiology. Dressing to abdomen changed with wet to dry dakins packing, 4x4, and medipore tape. Pt tolerated well. Daughter at bedside. Denies further needs at this time. Pt demonstrated how to reach nurse with call light. Call light in reach. Will continue to monitor. The care plan and education has been reviewed and mutually agreed upon with the patient. 1106 pt's oxygen continues to be between 88-92% on room air with congested/moist cough. Encouraged pt to cough and deep breathe, sitting up in chair with 1.5L O2 in place. 1346 per therapy patient was at 97% sitting in chair, after ambulating in hallway and to bathroom and back in chair oxygen at 93%. Therapy stated oxygen sat dropped once to 88%. 1556 pt mentioned something about holding her evening coreg, page sent to cardiology to verify    [phone] additional page to cardiology to see if 1700 dose of coreg needs to be held. 1655 per Dr. Zaheer Jacob, hold 1700 dose of coreg. See MAR.

## 2022-07-13 NOTE — ANESTHESIA POSTPROCEDURE EVALUATION
Patient: Aidan Mays    Procedure: Procedure(s):  exploratory laparotomy ; total abdominal hysterectomy ; right salpingo oophorectomy, SUTURE REPAIR OF THE BLADDER, and removal of ureteral catheters, lysis of adhesions, bilateral ureterolysis  CYSTOSCOPY,PLACING BILATERAL  URETERAL CATHETERS       Anesthesia Type:  General    Note:     Postop Pain Control: Uneventful            Sign Out: Well controlled pain   PONV: No   Neuro/Psych: Uneventful            Sign Out: Acceptable/Baseline neuro status   Airway/Respiratory: Uneventful            Sign Out: Acceptable/Baseline resp. status   CV/Hemodynamics: Uneventful            Sign Out: Acceptable CV status; No obvious hypovolemia; No obvious fluid overload   Other NRE: NONE   DID A NON-ROUTINE EVENT OCCUR? No           Last vitals:  Vitals Value Taken Time   /71 07/13/22 1200   Temp 36.2  C (97.2  F) 07/13/22 1130   Pulse 79 07/13/22 1205   Resp 12 07/13/22 1205   SpO2 100 % 07/13/22 1205   Vitals shown include unvalidated device data.    Electronically Signed By: Dennis Pan MD  July 13, 2022  12:07 PM

## 2022-07-14 LAB
ANION GAP SERPL CALCULATED.3IONS-SCNC: 6 MMOL/L (ref 3–14)
BUN SERPL-MCNC: 18 MG/DL (ref 7–30)
CALCIUM SERPL-MCNC: 8 MG/DL (ref 8.5–10.1)
CHLORIDE BLD-SCNC: 108 MMOL/L (ref 94–109)
CO2 SERPL-SCNC: 20 MMOL/L (ref 20–32)
CREAT SERPL-MCNC: 0.74 MG/DL (ref 0.52–1.04)
GFR SERPL CREATININE-BSD FRML MDRD: >90 ML/MIN/1.73M2
GLUCOSE BLD-MCNC: 92 MG/DL (ref 70–99)
GLUCOSE BLD-MCNC: 92 MG/DL (ref 70–99)
HGB BLD-MCNC: 11.3 G/DL (ref 11.7–15.7)
PLATELET # BLD AUTO: 179 10E3/UL (ref 150–450)
POTASSIUM BLD-SCNC: 3.9 MMOL/L (ref 3.4–5.3)
SODIUM SERPL-SCNC: 134 MMOL/L (ref 133–144)

## 2022-07-14 PROCEDURE — 36415 COLL VENOUS BLD VENIPUNCTURE: CPT | Performed by: PHYSICIAN ASSISTANT

## 2022-07-14 PROCEDURE — 85049 AUTOMATED PLATELET COUNT: CPT | Performed by: OBSTETRICS & GYNECOLOGY

## 2022-07-14 PROCEDURE — 120N000001 HC R&B MED SURG/OB

## 2022-07-14 PROCEDURE — 250N000013 HC RX MED GY IP 250 OP 250 PS 637: Performed by: PHYSICIAN ASSISTANT

## 2022-07-14 PROCEDURE — 250N000013 HC RX MED GY IP 250 OP 250 PS 637: Performed by: OBSTETRICS & GYNECOLOGY

## 2022-07-14 PROCEDURE — 80048 BASIC METABOLIC PNL TOTAL CA: CPT | Performed by: PHYSICIAN ASSISTANT

## 2022-07-14 PROCEDURE — 258N000003 HC RX IP 258 OP 636: Performed by: PHYSICIAN ASSISTANT

## 2022-07-14 PROCEDURE — 250N000011 HC RX IP 250 OP 636: Performed by: PHYSICIAN ASSISTANT

## 2022-07-14 PROCEDURE — 82947 ASSAY GLUCOSE BLOOD QUANT: CPT | Performed by: OBSTETRICS & GYNECOLOGY

## 2022-07-14 PROCEDURE — 85018 HEMOGLOBIN: CPT | Performed by: PHYSICIAN ASSISTANT

## 2022-07-14 RX ORDER — OXYCODONE HYDROCHLORIDE 5 MG/1
5 TABLET ORAL EVERY 4 HOURS PRN
Qty: 20 TABLET | Refills: 0 | Status: SHIPPED | OUTPATIENT
Start: 2022-07-14 | End: 2023-01-27

## 2022-07-14 RX ORDER — CIPROFLOXACIN 500 MG/1
500 TABLET, FILM COATED ORAL EVERY 12 HOURS
Qty: 14 TABLET | Refills: 0 | Status: SHIPPED | OUTPATIENT
Start: 2022-07-14 | End: 2022-07-14

## 2022-07-14 RX ORDER — AMOXICILLIN 250 MG
1-2 CAPSULE ORAL 2 TIMES DAILY
Qty: 30 TABLET | Refills: 0 | Status: SHIPPED | OUTPATIENT
Start: 2022-07-14 | End: 2023-03-27

## 2022-07-14 RX ORDER — ACETAMINOPHEN 325 MG/1
975 TABLET ORAL EVERY 6 HOURS
COMMUNITY
Start: 2022-07-14 | End: 2023-01-27

## 2022-07-14 RX ORDER — CIPROFLOXACIN 500 MG/1
500 TABLET, FILM COATED ORAL EVERY 12 HOURS
Qty: 16 TABLET | Refills: 0 | Status: SHIPPED | OUTPATIENT
Start: 2022-07-14 | End: 2022-07-22

## 2022-07-14 RX ORDER — IBUPROFEN 600 MG/1
600 TABLET, FILM COATED ORAL EVERY 6 HOURS
COMMUNITY
Start: 2022-07-14 | End: 2023-01-27

## 2022-07-14 RX ADMIN — ESTRADIOL 1 MG: 1 TABLET ORAL at 08:06

## 2022-07-14 RX ADMIN — KETOROLAC TROMETHAMINE 15 MG: 15 INJECTION, SOLUTION INTRAMUSCULAR; INTRAVENOUS at 01:09

## 2022-07-14 RX ADMIN — SENNOSIDES AND DOCUSATE SODIUM 1 TABLET: 50; 8.6 TABLET ORAL at 08:06

## 2022-07-14 RX ADMIN — KETOROLAC TROMETHAMINE 15 MG: 15 INJECTION, SOLUTION INTRAMUSCULAR; INTRAVENOUS at 19:39

## 2022-07-14 RX ADMIN — HYDROMORPHONE HYDROCHLORIDE 0.4 MG: 0.2 INJECTION, SOLUTION INTRAMUSCULAR; INTRAVENOUS; SUBCUTANEOUS at 16:01

## 2022-07-14 RX ADMIN — SIMETHICONE 80 MG: 80 TABLET, CHEWABLE ORAL at 01:09

## 2022-07-14 RX ADMIN — KETOROLAC TROMETHAMINE 15 MG: 15 INJECTION, SOLUTION INTRAMUSCULAR; INTRAVENOUS at 13:14

## 2022-07-14 RX ADMIN — ACETAMINOPHEN 975 MG: 325 TABLET ORAL at 01:07

## 2022-07-14 RX ADMIN — HYDROMORPHONE HYDROCHLORIDE 0.4 MG: 0.2 INJECTION, SOLUTION INTRAMUSCULAR; INTRAVENOUS; SUBCUTANEOUS at 10:25

## 2022-07-14 RX ADMIN — KETOROLAC TROMETHAMINE 15 MG: 15 INJECTION, SOLUTION INTRAMUSCULAR; INTRAVENOUS at 06:58

## 2022-07-14 RX ADMIN — ACETAMINOPHEN 975 MG: 325 TABLET ORAL at 19:39

## 2022-07-14 RX ADMIN — OXYCODONE HYDROCHLORIDE 10 MG: 5 TABLET ORAL at 04:39

## 2022-07-14 RX ADMIN — SODIUM CHLORIDE: 9 INJECTION, SOLUTION INTRAVENOUS at 00:03

## 2022-07-14 RX ADMIN — ENOXAPARIN SODIUM 40 MG: 40 INJECTION SUBCUTANEOUS at 08:06

## 2022-07-14 RX ADMIN — CIPROFLOXACIN HYDROCHLORIDE 500 MG: 500 TABLET, FILM COATED ORAL at 19:40

## 2022-07-14 RX ADMIN — HYDROMORPHONE HYDROCHLORIDE 0.2 MG: 0.2 INJECTION, SOLUTION INTRAMUSCULAR; INTRAVENOUS; SUBCUTANEOUS at 23:46

## 2022-07-14 RX ADMIN — CIPROFLOXACIN HYDROCHLORIDE 500 MG: 500 TABLET, FILM COATED ORAL at 10:25

## 2022-07-14 RX ADMIN — OXYCODONE HYDROCHLORIDE 5 MG: 5 TABLET ORAL at 20:36

## 2022-07-14 RX ADMIN — HYDROMORPHONE HYDROCHLORIDE 0.2 MG: 0.2 INJECTION, SOLUTION INTRAMUSCULAR; INTRAVENOUS; SUBCUTANEOUS at 08:07

## 2022-07-14 RX ADMIN — POLYETHYLENE GLYCOL 3350 17 G: 17 POWDER, FOR SOLUTION ORAL at 08:06

## 2022-07-14 RX ADMIN — SENNOSIDES AND DOCUSATE SODIUM 1 TABLET: 50; 8.6 TABLET ORAL at 20:36

## 2022-07-14 RX ADMIN — HYDROMORPHONE HYDROCHLORIDE 0.4 MG: 0.2 INJECTION, SOLUTION INTRAMUSCULAR; INTRAVENOUS; SUBCUTANEOUS at 03:15

## 2022-07-14 RX ADMIN — ACETAMINOPHEN 975 MG: 325 TABLET ORAL at 13:13

## 2022-07-14 RX ADMIN — ACETAMINOPHEN 975 MG: 325 TABLET ORAL at 06:35

## 2022-07-14 ASSESSMENT — ACTIVITIES OF DAILY LIVING (ADL)
ADLS_ACUITY_SCORE: 20

## 2022-07-14 NOTE — PLAN OF CARE
POD#1. Pt is A&Ox4. VSS on RA. Capno WNL. C/o 7/10 abdominal/gas pain, gave PRN dilaudid, oxy, mylicon, and scheduled tylenol & toradol. Dried sanguinous drainage in abdominal dressing noted, marked and unchanged. Abdominal binder and ice in place. Tolerating full liquid diet well, denies N/V. Perez patent with dark purple urine noted. L PIV infusing NS @ 100mL/hr with intermittent zosyn. Ambulated in room. Discharge pending improvement.

## 2022-07-14 NOTE — PROVIDER NOTIFICATION
MD Notification    Notified Person: MD    Notified Person Name: Dr. Sanchez     Notification Date/Time: 7/13/22 3408    Notification Interaction: on call answering line     Purpose of Notification:    Pt c/o abdominal cramping, can we order mylicon?     Orders Received:  Mylicon ordered- via verbal telephone order     Comments:

## 2022-07-14 NOTE — PROGRESS NOTES
"[facility]  Hospitalist Progress Note          Assessment and Plan:     Fibroids    Pelvic adhesions:  POD 1 TAHRSO, left salpingectomy, extensive JOLENE, suture repair of bladder.  Doing well.  Passing flatus.  Advance diet.  Keep alcantara in place x 10 days. Routine post op care.     DVT prophylaxis:  Lovenox while in hospital.                   Interval History:   doing well              Medications:   I have reviewed this patient's current medications               Physical Exam:   Blood pressure 96/61, pulse 73, temperature 98.3  F (36.8  C), temperature source Oral, resp. rate 18, height 1.6 m (5' 3\"), weight 66.8 kg (147 lb 4.8 oz), SpO2 98 %.        Vital Sign Ranges  Temperature Temp  Av  F (36.7  C)  Min: 97.2  F (36.2  C)  Max: 98.9  F (37.2  C)   Blood pressure Systolic (24hrs), Av , Min:96 , Max:123        Diastolic (24hrs), Av, Min:61, Max:80      Pulse Pulse  Av.8  Min: 70  Max: 99   Respirations Resp  Av.2  Min: 13  Max: 27   Pulse oximetry SpO2  Av.9 %  Min: 97 %  Max: 100 %         Intake/Output Summary (Last 24 hours) at 2022 0906  Last data filed at 2022 0700  Gross per 24 hour   Intake 3490 ml   Output 500 ml   Net 2990 ml       Lungs:   Clear     Cardiovascular:   Normal apical pulses      Abdomen:   Soft, non-tender, active BS, old staining of dressing                Data:   All laboratory data reviewed  "

## 2022-07-14 NOTE — DISCHARGE SUMMARY
"HOSPITAL DISCHARGE SUMMARY    Patient Name: Aidan Mays  YOB: 1980 Age: 41 year old  Medical Record Number: [id number]  Primary Physician: [facility] [place]  Phone: [phone]  Admission Date: 7/13/2022  Discharge Date:7/15/2022    Aidan Mays  will be discharged from [facility] to Home.    PRINCIPAL DISCHARGE DIAGNOSIS: Uterine fibroids, frozen pelvis.    BRIEF HOSPITAL COURSE: This 41 year old female admitted following below procedure. She tolerated the procedure well. Uneventful post operative course and discharge to home on POD #3 with adequate pain control, tolerating orals, voiding and ambulating.    PROCEDURES PERFORMED DURING HOSPITALIZATION:   Cystoscopy, bilateral ureteral catheter placement (Dr. Cummins)  Exploratory laparotomy, total abdominal hysterectomy, right salpingo-oophorectomy, left salpingectomy, extensive lysis of adhesions, bilateral ureterolysis, suture repair of bladder.    COMPLICATIONS IN HOSPITAL: None    CONSULTATIONS: None    PERTINENT FINDINGS/RESULTS AT DISCHARGE:   /65 (BP Location: Left arm)   Pulse 96   Temp 97.7  F (36.5  C) (Oral)   Resp 16   Ht 1.6 m (5' 3\")   Wt 66.8 kg (147 lb 4.8 oz)   LMP  (LMP Unknown)   SpO2 98%   BMI 26.09 kg/m      Latest Laboratory Results:  Chem:  CBC RESULTS: Recent Labs   Lab Test 07/14/22  0711 07/05/22  0934   WBC  --  5.2   RBC  --  4.80   HGB 11.3* 14.9   HCT  --  44.0   MCV  --  92   MCH  --  31.0   MCHC  --  33.9   RDW  --  13.5    208     Last Basic Metabolic Panel:  Lab Results   Component Value Date     07/14/2022      Lab Results   Component Value Date    POTASSIUM 3.9 07/14/2022     Lab Results   Component Value Date    CHLORIDE 108 07/14/2022     Lab Results   Component Value Date    CRISPIN 8.0 07/14/2022     Lab Results   Component Value Date    CO2 20 07/14/2022     Lab Results   Component Value Date    BUN 18 07/14/2022     Lab Results   Component Value Date "    CR 0.74 07/14/2022     Lab Results   Component Value Date    GLC 92 07/14/2022    GLC 92 07/14/2022         IMPORTANT PENDING TEST RESULTS:  Pathology    CONDITION AT DISCHARGE:    Stabilized    DISCHARGE ORDERS  Current Discharge Medication List      START taking these medications    Details   acetaminophen (TYLENOL) 325 MG tablet Take 3 tablets (975 mg) by mouth every 6 hours    Associated Diagnoses: Pelvic adhesions      ciprofloxacin (CIPRO) 500 MG tablet Take 1 tablet (500 mg) by mouth every 12 hours for 8 days  Qty: 16 tablet, Refills: 0    Associated Diagnoses: Pelvic adhesions      estradiol (ESTRACE) 1 MG tablet Take 1 tablet (1 mg) by mouth daily  Qty: 30 tablet, Refills: 12    Associated Diagnoses: Pelvic adhesions      ibuprofen (ADVIL/MOTRIN) 600 MG tablet Take 1 tablet (600 mg) by mouth every 6 hours    Associated Diagnoses: Pelvic adhesions      oxyCODONE (ROXICODONE) 5 MG tablet Take 1 tablet (5 mg) by mouth every 4 hours as needed for moderate to severe pain  Qty: 20 tablet, Refills: 0    Associated Diagnoses: Pelvic adhesions      senna-docusate (SENOKOT-S/PERICOLACE) 8.6-50 MG tablet Take 1-2 tablets by mouth 2 times daily Take while taking oxycodone, or until bowels are moving.  Qty: 30 tablet, Refills: 0    Associated Diagnoses: Pelvic adhesions         CONTINUE these medications which have NOT CHANGED    Details   docusate sodium (COLACE) 100 MG capsule Take 1 capsule (100 mg) by mouth 2 times daily as needed for constipation      Multiple Vitamin (MULTI-VITAMIN) per tablet Take 1 tablet by mouth daily.         STOP taking these medications       ferrous sulfate (FEROSUL) 325 (65 Fe) MG tablet Comments:   Reason for Stopping:         metroNIDAZOLE (FLAGYL) 500 MG tablet Comments:   Reason for Stopping:         MYFEMBREE 40-1-0.5 MG TABS Comments:   Reason for Stopping:         neomycin (MYCIFRADIN) 500 MG tablet Comments:   Reason for Stopping:               DISCHARGE INSTRUCTION.  Discharge  instructions following your gynecologic procedure:  Returning to work/activities:  -Nothing per vagina for 8 weeks  -Typically patients can expect to return to light work within 2-4 weeks.  -No driving or operating machinery while taking prescription pain medication.  -You should avoid heavy lifting until your follow up visit. No lifting greater than 20 pounds for 2 weeks.     Diet:  -as tolerated    Pain:  -Motrin (or other NSAIDs) are a good additional therapy and recommend trying this in addition to other pain relievers.  -Typically there is a minimal to moderate amount of incisional pain after surgery.  - An ice pack is recommended intermittently for the first 48 hours to help reduce pain & swelling.  -Most patients are provided a prescription for medication to take on a short term basis to help relieve the discomfort.  -If pain medication refills are needed we require you contact our office during regular business hours. (8am-5pm Monday-Friday)  -Please be aware that pain medication can cause constipation.  It may be recommended to take an over the counter stool softener as directed to prevent constipation.     Constipation:  -The pain medication you are prescribed at the time of your surgery can cause constipation.   -We recommend that you take an over the counter stool softener such as colace or senokot-s on a regular basis until you have stopped the pain medication or bowel movements are regular. You make take 1-4 tablets twice per day.   -For constipation lasting 3 days please take Milk of Magnesia or Miralax as directed on the bottles. If you have taken Milk of Magnesia and Miralax and still have not had a bowel movement please contact your our office.    Incision/Wound care:  -Your incision is closed with staples; these will be removed at your follow up appointment. You may get the staples wet in the shower and have soap/water rinse over the top then pat dry. You should not bathe/swim/submerge incisions  until they are completely healed. It is okay to cover your incision with clean dry gauze or leave uncovered. If using gauze, please change daily. If you noticed redness, swelling, or any unusual drainage from the incision, you may call our office at [phone].      Vaginal drainage/spotting:  -Following a hysterectomy you may have vaginal drainage/spotting for up to 4-6 weeks from surgery. If more than a regular period please call our office.     Bladder symptoms:  -Follow up with Dr. Sanchez's office on 7/22/22 for alcantara catheter removal.    Follow up care:  -You will be asked to see Dr. Sanchez's Nurse Practitioner, Physician Assistant, or RN in 2 weeks and in 8 weeks.   -You will also be seen on 7/22/22 for alcantara catheter removal.  -If you don't already have an appointment, please contact the office and our staff will happily assist you in scheduling your appointment. Bring your insurance card & government issued ID card to your appointment.    CALL YOUR SURGEON @[phone] FOR ANY OF THE FOLLOWING:  -Temperature greater than 101 degrees Fahrenheit  -Increased pain  -Increased shortness of breath  -Increased bleeding or drainage or vaginal bleeding greater than a regular menses.   -Pus-like drainage, increasing redness, swelling, tenderness, or warmth at the incision site  -Persistent nausea or vomiting          FOLLOW-UP: Aidan Mays should see [facility] [place] [facility].    Specialty follow-up: as above.     AFTER HOSPITAL RECOMMENDATIONS  As above.      Physician(s) in addition to primary physician who should receive a copy:  [facility] [place]       Dayami [place], PA-C

## 2022-07-15 LAB
GLUCOSE BLD-MCNC: 84 MG/DL (ref 70–99)
GLUCOSE BLDC GLUCOMTR-MCNC: 84 MG/DL (ref 70–99)
PATH REPORT.COMMENTS IMP SPEC: NORMAL
PATH REPORT.COMMENTS IMP SPEC: NORMAL
PATH REPORT.FINAL DX SPEC: NORMAL
PATH REPORT.GROSS SPEC: NORMAL
PATH REPORT.INTRAOP OBS SPEC DOC: NORMAL
PATH REPORT.MICROSCOPIC SPEC OTHER STN: NORMAL
PATH REPORT.RELEVANT HX SPEC: NORMAL
PHOTO IMAGE: NORMAL

## 2022-07-15 PROCEDURE — 120N000001 HC R&B MED SURG/OB

## 2022-07-15 PROCEDURE — 258N000003 HC RX IP 258 OP 636: Performed by: OBSTETRICS & GYNECOLOGY

## 2022-07-15 PROCEDURE — 250N000013 HC RX MED GY IP 250 OP 250 PS 637: Performed by: PHYSICIAN ASSISTANT

## 2022-07-15 PROCEDURE — 36415 COLL VENOUS BLD VENIPUNCTURE: CPT | Performed by: OBSTETRICS & GYNECOLOGY

## 2022-07-15 PROCEDURE — 82947 ASSAY GLUCOSE BLOOD QUANT: CPT | Performed by: OBSTETRICS & GYNECOLOGY

## 2022-07-15 PROCEDURE — 250N000011 HC RX IP 250 OP 636: Performed by: PHYSICIAN ASSISTANT

## 2022-07-15 RX ADMIN — IBUPROFEN 800 MG: 400 TABLET, FILM COATED ORAL at 21:56

## 2022-07-15 RX ADMIN — ACETAMINOPHEN 975 MG: 325 TABLET ORAL at 21:56

## 2022-07-15 RX ADMIN — OXYCODONE HYDROCHLORIDE 5 MG: 5 TABLET ORAL at 14:10

## 2022-07-15 RX ADMIN — SENNOSIDES AND DOCUSATE SODIUM 1 TABLET: 50; 8.6 TABLET ORAL at 21:56

## 2022-07-15 RX ADMIN — ACETAMINOPHEN 975 MG: 325 TABLET ORAL at 10:03

## 2022-07-15 RX ADMIN — ACETAMINOPHEN 975 MG: 325 TABLET ORAL at 02:49

## 2022-07-15 RX ADMIN — ENOXAPARIN SODIUM 40 MG: 40 INJECTION SUBCUTANEOUS at 10:05

## 2022-07-15 RX ADMIN — IBUPROFEN 800 MG: 400 TABLET, FILM COATED ORAL at 16:55

## 2022-07-15 RX ADMIN — IBUPROFEN 800 MG: 400 TABLET, FILM COATED ORAL at 02:49

## 2022-07-15 RX ADMIN — ESTRADIOL 1 MG: 1 TABLET ORAL at 10:25

## 2022-07-15 RX ADMIN — ACETAMINOPHEN 975 MG: 325 TABLET ORAL at 16:55

## 2022-07-15 RX ADMIN — SENNOSIDES AND DOCUSATE SODIUM 1 TABLET: 50; 8.6 TABLET ORAL at 10:04

## 2022-07-15 RX ADMIN — IBUPROFEN 800 MG: 400 TABLET, FILM COATED ORAL at 10:04

## 2022-07-15 RX ADMIN — SODIUM CHLORIDE: 9 INJECTION, SOLUTION INTRAVENOUS at 03:23

## 2022-07-15 RX ADMIN — CIPROFLOXACIN HYDROCHLORIDE 500 MG: 500 TABLET, FILM COATED ORAL at 10:25

## 2022-07-15 RX ADMIN — CIPROFLOXACIN HYDROCHLORIDE 500 MG: 500 TABLET, FILM COATED ORAL at 21:56

## 2022-07-15 ASSESSMENT — ACTIVITIES OF DAILY LIVING (ADL)
ADLS_ACUITY_SCORE: 22
ADLS_ACUITY_SCORE: 23
ADLS_ACUITY_SCORE: 26
ADLS_ACUITY_SCORE: 22
ADLS_ACUITY_SCORE: 23
ADLS_ACUITY_SCORE: 26
ADLS_ACUITY_SCORE: 23
ADLS_ACUITY_SCORE: 23
ADLS_ACUITY_SCORE: 20
ADLS_ACUITY_SCORE: 23
ADLS_ACUITY_SCORE: 23
ADLS_ACUITY_SCORE: 22

## 2022-07-15 NOTE — PLAN OF CARE
Goal Outcome Evaluation:     A&Ox4. POD2, VSS on RA. Pain managed with schedule medications and IV dilaudid and oxy as well as ice. Bilateral PIV SL. Dried drainage on abdominal dressing. SBA, not OOB during the night. On regular diet. Perez patent, dark red urine output-minimal output, given 500 mL/hr NS-Bolus. Discharge pending improvement.

## 2022-07-15 NOTE — PLAN OF CARE
A&Ox4. POD1, VSS on RA. Pain managed with schedule medications and IV dilaudid, as well as ice. Bilateral PIV SL. Dried drainage on abdominal dressing, unchanged throughout shift. SBA, ambulated x1. Diet advanced to regular, tolerated well. Perez patent, dark red urine output. Discharge pending improvement.

## 2022-07-15 NOTE — PLAN OF CARE
Goal Outcome Evaluation:    Plan of care discussed with patient and  at bedside.     A/O x 4.  VSS on RA.  POD 2 Exploratory laparotomy, total abdominal hysterectomy, right salpingo-oophorectomy, left salpingectomy, extensive lysis of adhesions, bilateral ureterolysis, suture repair of bladder.  Midline abdominal surgical incision; padding under abdominal binder incision open to air; well approximated; no drainage; staples intact. C/O 5/10 abdominal pain/fullness; controlled with scheduled Tylenol/Ibuprofen and PRN Oxycodone(5 mg). Ambulated in halls x 3 this shift; up with SBA.  Alcantara patent with maroon tinged urine with good output; BM x 1; passing gas.  Discharge order for 7/16/22 AM; patient will discharge home with alcantara.      Alcantara teaching completed with spouse; supplies given to spouse.

## 2022-07-16 VITALS
HEART RATE: 106 BPM | RESPIRATION RATE: 16 BRPM | WEIGHT: 148.4 LBS | BODY MASS INDEX: 26.29 KG/M2 | DIASTOLIC BLOOD PRESSURE: 76 MMHG | SYSTOLIC BLOOD PRESSURE: 117 MMHG | OXYGEN SATURATION: 99 % | TEMPERATURE: 98.2 F | HEIGHT: 63 IN

## 2022-07-16 PROCEDURE — 250N000013 HC RX MED GY IP 250 OP 250 PS 637: Performed by: PHYSICIAN ASSISTANT

## 2022-07-16 PROCEDURE — 250N000011 HC RX IP 250 OP 636: Performed by: PHYSICIAN ASSISTANT

## 2022-07-16 RX ADMIN — ESTRADIOL 1 MG: 1 TABLET ORAL at 08:15

## 2022-07-16 RX ADMIN — IBUPROFEN 800 MG: 400 TABLET, FILM COATED ORAL at 04:30

## 2022-07-16 RX ADMIN — ACETAMINOPHEN 975 MG: 325 TABLET ORAL at 04:30

## 2022-07-16 RX ADMIN — CIPROFLOXACIN HYDROCHLORIDE 500 MG: 500 TABLET, FILM COATED ORAL at 08:15

## 2022-07-16 RX ADMIN — ENOXAPARIN SODIUM 40 MG: 40 INJECTION SUBCUTANEOUS at 08:15

## 2022-07-16 ASSESSMENT — ACTIVITIES OF DAILY LIVING (ADL)
ADLS_ACUITY_SCORE: 26
ADLS_ACUITY_SCORE: 26
ADLS_ACUITY_SCORE: 20
ADLS_ACUITY_SCORE: 26
ADLS_ACUITY_SCORE: 26

## 2022-07-16 NOTE — PROGRESS NOTES
Discharge Note    Patient discharged to home via private vehicle  accompanied by significant other .  IV: Discontinued  Prescriptions filled and given to patient/family.   Belongings reviewed and sent with patient.   Home medications returned to patient: NA  Equipment sent with: patient, N/A.   patient verbalizes understanding of discharge instructions. AVS given to patient.      Pt and spouse stated all belongings with them; no medications in pharmacy or anything in safe.  LEE care instructions reviewed with spouse and patient at bedside with teach back.  Supplies sent home with patient.

## 2022-07-16 NOTE — PLAN OF CARE
Goal Outcome Evaluation:      POD3 A&O x 4, VSS on RA. Abd pain managed with scheduled tylenol and ibuprofen. PIV SL x 2. Heat pack for right shoulder pain. Midline abdominal incision healing appropriately, abd drainage with scant serosanguinous drainage, abd binder in place. Alcantara patent, continues to have dark maroon urine output. Passing gas, BM 7/15/22. Likely discharge home today with alcantara, outpatient follow up for alcantara removal.

## 2022-07-16 NOTE — PROVIDER NOTIFICATION
MD Notification    Notified Person: MD    Notified Person Name:    Notification Date/Time:  0846    Notification Interaction: paged    Purpose of Notification:  Clarification patient does not need to be seen and okay to discharge.     Orders Received:    Comments:

## 2022-07-19 ENCOUNTER — HOSPITAL ENCOUNTER (EMERGENCY)
Facility: CLINIC | Age: 42
Discharge: HOME | End: 2022-07-19
Attending: EMERGENCY MEDICINE | Admitting: EMERGENCY MEDICINE
Payer: COMMERCIAL

## 2022-07-19 VITALS
RESPIRATION RATE: 18 BRPM | SYSTOLIC BLOOD PRESSURE: 120 MMHG | TEMPERATURE: 97.1 F | OXYGEN SATURATION: 98 % | DIASTOLIC BLOOD PRESSURE: 64 MMHG | HEART RATE: 101 BPM

## 2022-07-19 DIAGNOSIS — R33.9 URINARY RETENTION: ICD-10-CM

## 2022-07-19 LAB
ALBUMIN UR-MCNC: 70 MG/DL
APPEARANCE UR: CLEAR
BILIRUB UR QL STRIP: NEGATIVE
COLOR UR AUTO: ABNORMAL
GLUCOSE UR STRIP-MCNC: NEGATIVE MG/DL
HGB UR QL STRIP: ABNORMAL
KETONES UR STRIP-MCNC: NEGATIVE MG/DL
LEUKOCYTE ESTERASE UR QL STRIP: NEGATIVE
NITRATE UR QL: NEGATIVE
PH UR STRIP: 7 [PH] (ref 5–7)
RBC URINE: 2 /HPF
SP GR UR STRIP: 1.01 (ref 1–1.03)
SQUAMOUS EPITHELIAL: <1 /HPF
UROBILINOGEN UR STRIP-MCNC: NORMAL MG/DL
WBC URINE: 1 /HPF

## 2022-07-19 PROCEDURE — 51702 INSERT TEMP BLADDER CATH: CPT

## 2022-07-19 PROCEDURE — 99284 EMERGENCY DEPT VISIT MOD MDM: CPT

## 2022-07-19 PROCEDURE — 51798 US URINE CAPACITY MEASURE: CPT

## 2022-07-19 PROCEDURE — 81001 URINALYSIS AUTO W/SCOPE: CPT | Performed by: EMERGENCY MEDICINE

## 2022-07-19 ASSESSMENT — ENCOUNTER SYMPTOMS: ABDOMINAL DISTENTION: 1

## 2022-07-20 NOTE — ED TRIAGE NOTES
Patient states hystectomy Wed last week.  Went home with indwelling alcantara catheter.  Today, feeling very uncomfortable.  No urine in tube.  Symptoms at 4 hours. (Bladder needed to be repaired also).     Triage Assessment     Row Name 07/19/22 6370       Triage Assessment (Adult)    Airway WDL WDL       Respiratory WDL    Respiratory WDL WDL       Skin Circulation/Temperature WDL    Skin Circulation/Temperature WDL WDL       Cardiac WDL    Cardiac WDL WDL       Peripheral/Neurovascular WDL    Peripheral Neurovascular WDL WDL       Cognitive/Neuro/Behavioral WDL    Cognitive/Neuro/Behavioral WDL WDL

## 2022-07-20 NOTE — DISCHARGE INSTRUCTIONS
We have replaced your catheter.  If you have trouble with this or does not drain again the emergency room is here to assist you at any point.  Please follow-up with your catheter removal as scheduled.  There were no signs of infection tonight.  We wish you best of luck as you heal from your surgery.

## 2022-07-20 NOTE — ED PROVIDER NOTES
History   Chief Complaint:  Urinary Retention and Catheter Problem       HPI   Aidan Mays is a 41 year old female who presents with urinary retention and catheter problem. The patient had a hysterectomy 6 days ago, and her bladder had to be repaired as well due to its attachment to the uterus from her history of endometriosis. She was discharged with an indwelling alcantara catheter. She is now feeling very uncomfortable, and urine has not been draining for the past four hours. She has abdominal distension. Her bowel movements have been normal, her last one prior to her arrival here.     Review of Systems   Gastrointestinal: Positive for abdominal distention.   Genitourinary:        Urinary retention with catheter   All other systems reviewed and are negative.      Allergies:  No known drug allergies     Medications:  Ciprofloxacin  Estradiol  Oxycodone  Senna-docusate  Colace    Past Medical History:     Endometriosis  Pelvic adhesions  Fibroids  Uterine Leiomyoma    Past Surgical History:     section  Bilateral ureteral catheters  Hysterectomy  Laparotomy exploratory  Left leg fracture repair  Left thyroid nodule fine needle aspiration     Family History:    Mother- fibroids, hypertension     Social History:  The patient presents to the ED with   PCP: Latonia Singh MD    Physical Exam     Patient Vitals for the past 24 hrs:   BP Temp Pulse Resp SpO2   22 2134 120/64 97.1  F (36.2  C) 101 18 98 %       Physical Exam  Vitals reviewed.   Constitutional:       Comments: Uncomfortable appearing   Cardiovascular:      Rate and Rhythm: Normal rate.   Pulmonary:      Effort: Pulmonary effort is normal.   Abdominal:      General: Abdomen is flat.      Comments: Midline incision stapled without signs of dehiscence or infection distention in the suprapubic region region consistent with likely urinary retention.   Musculoskeletal:      Cervical back: Normal range of motion.   Skin:     General:  Skin is warm.   Neurological:      Mental Status: She is alert.         Emergency Department Course     Laboratory:  Labs Ordered and Resulted from Time of ED Arrival to Time of ED Departure   ROUTINE UA WITH MICROSCOPIC REFLEX TO CULTURE - Abnormal       Result Value    Color Urine Straw      Appearance Urine Clear      Glucose Urine Negative      Bilirubin Urine Negative      Ketones Urine Negative      Specific Gravity Urine 1.010      Blood Urine Small (*)     pH Urine 7.0      Protein Albumin Urine 70  (*)     Urobilinogen Urine Normal      Nitrite Urine Negative      Leukocyte Esterase Urine Negative      RBC Urine 2      WBC Urine 1      Squamous Epithelials Urine <1          Emergency Department Course:    Reviewed:  I reviewed nursing notes, vitals, past medical history and Care Everywhere    Assessments:  2138 I obtained history and examined the patient as noted above.   2230 I rechecked the patient and explained findings.     Disposition:  The patient was discharged to home.     Impression & Plan     Medical Decision Making:  Patient presents with lower abdominal pain with Perez catheter that clinically is not draining.  This was replaced with complete resolution in pain after urine was cleared from the bladder.  Patient was offered reassurance recommended follow-up with OB/GYN for reassessment and was discharged in stable condition.  Patient is on Cipro no UTI identified.    Diagnosis:    ICD-10-CM    1. Urinary retention  R33.9        Discharge Medications:  Discharge Medication List as of 7/19/2022 10:43 PM          Scribe Disclosure:  I, Jesika Sainz, am serving as a scribe at 9:36 PM on 7/19/2022 to document services personally performed by Chucky Landaverde MD based on my observations and the provider's statements to me.            Chucky Landaverde MD  07/20/22 0897

## 2022-07-22 ENCOUNTER — TRANSFERRED RECORDS (OUTPATIENT)
Dept: HEALTH INFORMATION MANAGEMENT | Facility: CLINIC | Age: 42
End: 2022-07-22

## 2022-07-27 ENCOUNTER — TRANSFERRED RECORDS (OUTPATIENT)
Dept: HEALTH INFORMATION MANAGEMENT | Facility: CLINIC | Age: 42
End: 2022-07-27

## 2022-09-09 ENCOUNTER — TRANSFERRED RECORDS (OUTPATIENT)
Dept: HEALTH INFORMATION MANAGEMENT | Facility: CLINIC | Age: 42
End: 2022-09-09

## 2022-09-20 ENCOUNTER — ANCILLARY PROCEDURE (OUTPATIENT)
Dept: MAMMOGRAPHY | Facility: CLINIC | Age: 42
End: 2022-09-20
Attending: FAMILY MEDICINE
Payer: COMMERCIAL

## 2022-09-20 DIAGNOSIS — Z12.31 VISIT FOR SCREENING MAMMOGRAM: ICD-10-CM

## 2022-09-20 PROCEDURE — 77063 BREAST TOMOSYNTHESIS BI: CPT | Mod: TC | Performed by: STUDENT IN AN ORGANIZED HEALTH CARE EDUCATION/TRAINING PROGRAM

## 2022-09-20 PROCEDURE — 77067 SCR MAMMO BI INCL CAD: CPT | Mod: TC | Performed by: STUDENT IN AN ORGANIZED HEALTH CARE EDUCATION/TRAINING PROGRAM

## 2022-09-22 ENCOUNTER — HOSPITAL ENCOUNTER (OUTPATIENT)
Dept: MAMMOGRAPHY | Facility: CLINIC | Age: 42
Discharge: HOME | End: 2022-09-22
Attending: FAMILY MEDICINE
Payer: COMMERCIAL

## 2022-09-22 DIAGNOSIS — R92.8 ABNORMAL MAMMOGRAM: ICD-10-CM

## 2022-09-22 PROCEDURE — 76642 ULTRASOUND BREAST LIMITED: CPT | Mod: 50

## 2022-09-22 PROCEDURE — 77062 BREAST TOMOSYNTHESIS BI: CPT

## 2023-01-07 ENCOUNTER — HEALTH MAINTENANCE LETTER (OUTPATIENT)
Age: 43
End: 2023-01-07

## 2023-01-27 ENCOUNTER — E-VISIT (OUTPATIENT)
Dept: FAMILY MEDICINE | Facility: CLINIC | Age: 43
End: 2023-01-27
Payer: COMMERCIAL

## 2023-01-27 DIAGNOSIS — N30.00 ACUTE CYSTITIS WITHOUT HEMATURIA: Primary | ICD-10-CM

## 2023-01-27 PROCEDURE — 99421 OL DIG E/M SVC 5-10 MIN: CPT | Performed by: FAMILY MEDICINE

## 2023-01-27 RX ORDER — SULFAMETHOXAZOLE/TRIMETHOPRIM 800-160 MG
1 TABLET ORAL 2 TIMES DAILY
Qty: 6 TABLET | Refills: 0 | Status: SHIPPED | OUTPATIENT
Start: 2023-01-27 | End: 2023-01-30

## 2023-01-27 NOTE — PATIENT INSTRUCTIONS
Dear Aidan Mays    After reviewing your responses, I've been able to diagnose you with a urinary tract infection, which is a common infection of the bladder with bacteria.  This is not a sexually transmitted infection, though urinating immediately after intercourse can help prevent infections.  Drinking lots of fluids is also helpful to clear your current infection and prevent the next one.      I have sent a prescription for antibiotics to your pharmacy to treat this infection.    It is important that you take all of your prescribed medication even if your symptoms are improving after a few doses.  Taking all of your medicine helps prevent the symptoms from returning.     If your symptoms worsen, you develop pain in your back or stomach, develop fevers, or are not improving in 5 days, please contact your primary care provider for an appointment or visit any of our convenient Walk-in or Urgent Care Centers to be seen, which can be found on our website here.    Thanks again for choosing us as your health care partner,    Sloane Castro MD

## 2023-03-13 ENCOUNTER — E-VISIT (OUTPATIENT)
Dept: URGENT CARE | Facility: CLINIC | Age: 43
End: 2023-03-13
Payer: COMMERCIAL

## 2023-03-13 DIAGNOSIS — J06.9 VIRAL URI WITH COUGH: Primary | ICD-10-CM

## 2023-03-13 PROCEDURE — 99421 OL DIG E/M SVC 5-10 MIN: CPT | Performed by: NURSE PRACTITIONER

## 2023-03-13 RX ORDER — BENZONATATE 100 MG/1
100 CAPSULE ORAL 3 TIMES DAILY PRN
Qty: 30 CAPSULE | Refills: 0 | Status: SHIPPED | OUTPATIENT
Start: 2023-03-13 | End: 2023-03-27

## 2023-03-13 NOTE — PATIENT INSTRUCTIONS
"  Dear Aidan Mays    After reviewing your responses, I've been able to diagnose you with \"Bronchitis\" which is a common infection of your lungs that is nearly always caused by a virus. The virus causes swelling and irritation of the air passages of your lungs which leads to cough. The illness spreads from your nose and throat to your windpipe and airways. It is often called a \"chest cold\" and can last up to 2 weeks, but is not a serious illness. Exposure to cigarette smoke usually makes this significantly worse.      To treat bronchitis, the main thing to do is drink lots of fluids and rest. Cough medications over-the-counter such as mucinex, robitussin or \"cold and sinus\" medications can be helpful. Ibuprofen and Tylenol also help with fevers or aching feelings that you often have with this kind of illness. Do not take ibuprofen if you have kidney disease, stomach ulcers or allergy to aspirin.     Bronchitis is most often highly contagious as viruses are spread through the air or touch. Avoid contact with others who may become infected, particularly children, the elderly and those whose immune systems might be weak.     If your symptoms worsen, you develop chest pain or shortness of breath, fevers over 101, or are not improving in 5 days, please contact your primary care provider for an appointment or visit any of our convenient Walk-in Care or Urgent Care Centers to be seen which can be found on our website here.    Thanks again for choosing us as your health care partner,    MARK Whatley CNP  "

## 2023-03-27 ENCOUNTER — HOSPITAL ENCOUNTER (INPATIENT)
Facility: CLINIC | Age: 43
LOS: 4 days | Discharge: HOME | DRG: 390 | End: 2023-03-31
Attending: EMERGENCY MEDICINE | Admitting: INTERNAL MEDICINE
Payer: COMMERCIAL

## 2023-03-27 ENCOUNTER — APPOINTMENT (OUTPATIENT)
Dept: CT IMAGING | Facility: CLINIC | Age: 43
DRG: 390 | End: 2023-03-27
Attending: EMERGENCY MEDICINE
Payer: COMMERCIAL

## 2023-03-27 DIAGNOSIS — Z87.19 HX SBO: Primary | ICD-10-CM

## 2023-03-27 DIAGNOSIS — K56.609 SMALL BOWEL OBSTRUCTION (H): ICD-10-CM

## 2023-03-27 DIAGNOSIS — K76.9 LESION OF RIGHT LOBE OF LIVER: ICD-10-CM

## 2023-03-27 DIAGNOSIS — D72.829 LEUKOCYTOSIS, UNSPECIFIED TYPE: ICD-10-CM

## 2023-03-27 DIAGNOSIS — R11.2 NAUSEA AND VOMITING, UNSPECIFIED VOMITING TYPE: ICD-10-CM

## 2023-03-27 PROBLEM — K76.89 NODULE ON LIVER: Status: ACTIVE | Noted: 2023-03-27

## 2023-03-27 PROBLEM — N73.6 PELVIC ADHESIONS: Status: ACTIVE | Noted: 2022-04-22

## 2023-03-27 LAB
ALBUMIN SERPL BCG-MCNC: 4.6 G/DL (ref 3.5–5.2)
ALBUMIN UR-MCNC: 10 MG/DL
ALP SERPL-CCNC: 97 U/L (ref 35–104)
ALT SERPL W P-5'-P-CCNC: 23 U/L (ref 10–35)
ANION GAP SERPL CALCULATED.3IONS-SCNC: 13 MMOL/L (ref 7–15)
APPEARANCE UR: CLEAR
AST SERPL W P-5'-P-CCNC: 33 U/L (ref 10–35)
BACTERIA #/AREA URNS HPF: ABNORMAL /HPF
BASOPHILS # BLD AUTO: 0.1 10E3/UL (ref 0–0.2)
BASOPHILS NFR BLD AUTO: 0 %
BILIRUB SERPL-MCNC: 0.4 MG/DL
BILIRUB UR QL STRIP: NEGATIVE
BUN SERPL-MCNC: 12.7 MG/DL (ref 6–20)
CALCIUM SERPL-MCNC: 10.2 MG/DL (ref 8.6–10)
CHLORIDE SERPL-SCNC: 103 MMOL/L (ref 98–107)
COLOR UR AUTO: ABNORMAL
CREAT SERPL-MCNC: 0.62 MG/DL (ref 0.51–0.95)
DEPRECATED HCO3 PLAS-SCNC: 26 MMOL/L (ref 22–29)
EOSINOPHIL # BLD AUTO: 0 10E3/UL (ref 0–0.7)
EOSINOPHIL NFR BLD AUTO: 0 %
ERYTHROCYTE [DISTWIDTH] IN BLOOD BY AUTOMATED COUNT: 13 % (ref 10–15)
FLUAV RNA SPEC QL NAA+PROBE: NEGATIVE
FLUBV RNA RESP QL NAA+PROBE: NEGATIVE
GFR SERPL CREATININE-BSD FRML MDRD: >90 ML/MIN/1.73M2
GLUCOSE SERPL-MCNC: 146 MG/DL (ref 70–99)
GLUCOSE UR STRIP-MCNC: NEGATIVE MG/DL
HCT VFR BLD AUTO: 48.3 % (ref 35–47)
HGB BLD-MCNC: 16 G/DL (ref 11.7–15.7)
HGB UR QL STRIP: NEGATIVE
IMM GRANULOCYTES # BLD: 0 10E3/UL
IMM GRANULOCYTES NFR BLD: 0 %
KETONES UR STRIP-MCNC: 10 MG/DL
LEUKOCYTE ESTERASE UR QL STRIP: NEGATIVE
LIPASE SERPL-CCNC: 33 U/L (ref 13–60)
LYMPHOCYTES # BLD AUTO: 0.5 10E3/UL (ref 0.8–5.3)
LYMPHOCYTES NFR BLD AUTO: 4 %
MCH RBC QN AUTO: 28.6 PG (ref 26.5–33)
MCHC RBC AUTO-ENTMCNC: 33.1 G/DL (ref 31.5–36.5)
MCV RBC AUTO: 86 FL (ref 78–100)
MONOCYTES # BLD AUTO: 0.5 10E3/UL (ref 0–1.3)
MONOCYTES NFR BLD AUTO: 4 %
MUCOUS THREADS #/AREA URNS LPF: PRESENT /LPF
NEUTROPHILS # BLD AUTO: 12 10E3/UL (ref 1.6–8.3)
NEUTROPHILS NFR BLD AUTO: 92 %
NITRATE UR QL: NEGATIVE
NRBC # BLD AUTO: 0 10E3/UL
NRBC BLD AUTO-RTO: 0 /100
PH UR STRIP: 7 [PH] (ref 5–7)
PLATELET # BLD AUTO: 331 10E3/UL (ref 150–450)
POTASSIUM SERPL-SCNC: 4.2 MMOL/L (ref 3.4–5.3)
PROT SERPL-MCNC: 8.4 G/DL (ref 6.4–8.3)
RBC # BLD AUTO: 5.59 10E6/UL (ref 3.8–5.2)
RBC URINE: <1 /HPF
RSV RNA SPEC NAA+PROBE: NEGATIVE
SARS-COV-2 RNA RESP QL NAA+PROBE: NEGATIVE
SODIUM SERPL-SCNC: 142 MMOL/L (ref 136–145)
SP GR UR STRIP: 1 (ref 1–1.03)
SQUAMOUS EPITHELIAL: 4 /HPF
UROBILINOGEN UR STRIP-MCNC: NORMAL MG/DL
WBC # BLD AUTO: 13 10E3/UL (ref 4–11)
WBC URINE: <1 /HPF

## 2023-03-27 PROCEDURE — 74177 CT ABD & PELVIS W/CONTRAST: CPT

## 2023-03-27 PROCEDURE — 250N000013 HC RX MED GY IP 250 OP 250 PS 637: Performed by: HOSPITALIST

## 2023-03-27 PROCEDURE — 250N000011 HC RX IP 250 OP 636: Performed by: INTERNAL MEDICINE

## 2023-03-27 PROCEDURE — 83690 ASSAY OF LIPASE: CPT | Performed by: EMERGENCY MEDICINE

## 2023-03-27 PROCEDURE — 120N000001 HC R&B MED SURG/OB

## 2023-03-27 PROCEDURE — 87637 SARSCOV2&INF A&B&RSV AMP PRB: CPT | Performed by: EMERGENCY MEDICINE

## 2023-03-27 PROCEDURE — 80053 COMPREHEN METABOLIC PANEL: CPT | Performed by: EMERGENCY MEDICINE

## 2023-03-27 PROCEDURE — 258N000003 HC RX IP 258 OP 636: Performed by: EMERGENCY MEDICINE

## 2023-03-27 PROCEDURE — 99223 1ST HOSP IP/OBS HIGH 75: CPT | Mod: AI | Performed by: INTERNAL MEDICINE

## 2023-03-27 PROCEDURE — 82310 ASSAY OF CALCIUM: CPT | Performed by: EMERGENCY MEDICINE

## 2023-03-27 PROCEDURE — 258N000003 HC RX IP 258 OP 636: Performed by: INTERNAL MEDICINE

## 2023-03-27 PROCEDURE — 250N000009 HC RX 250: Performed by: EMERGENCY MEDICINE

## 2023-03-27 PROCEDURE — 81001 URINALYSIS AUTO W/SCOPE: CPT | Performed by: EMERGENCY MEDICINE

## 2023-03-27 PROCEDURE — C9803 HOPD COVID-19 SPEC COLLECT: HCPCS

## 2023-03-27 PROCEDURE — C9113 INJ PANTOPRAZOLE SODIUM, VIA: HCPCS | Performed by: INTERNAL MEDICINE

## 2023-03-27 PROCEDURE — 96375 TX/PRO/DX INJ NEW DRUG ADDON: CPT

## 2023-03-27 PROCEDURE — 250N000011 HC RX IP 250 OP 636: Performed by: EMERGENCY MEDICINE

## 2023-03-27 PROCEDURE — 85025 COMPLETE CBC W/AUTO DIFF WBC: CPT | Performed by: EMERGENCY MEDICINE

## 2023-03-27 PROCEDURE — 96374 THER/PROPH/DIAG INJ IV PUSH: CPT | Mod: 59

## 2023-03-27 PROCEDURE — 36415 COLL VENOUS BLD VENIPUNCTURE: CPT | Performed by: EMERGENCY MEDICINE

## 2023-03-27 PROCEDURE — 99285 EMERGENCY DEPT VISIT HI MDM: CPT | Mod: 25

## 2023-03-27 PROCEDURE — 96361 HYDRATE IV INFUSION ADD-ON: CPT

## 2023-03-27 RX ORDER — PROCHLORPERAZINE 25 MG
25 SUPPOSITORY, RECTAL RECTAL EVERY 12 HOURS PRN
Status: DISCONTINUED | OUTPATIENT
Start: 2023-03-27 | End: 2023-03-31 | Stop reason: HOSPADM

## 2023-03-27 RX ORDER — ONDANSETRON 2 MG/ML
4 INJECTION INTRAMUSCULAR; INTRAVENOUS ONCE
Status: COMPLETED | OUTPATIENT
Start: 2023-03-27 | End: 2023-03-27

## 2023-03-27 RX ORDER — HYDROMORPHONE HCL IN WATER/PF 6 MG/30 ML
0.2 PATIENT CONTROLLED ANALGESIA SYRINGE INTRAVENOUS
Status: DISCONTINUED | OUTPATIENT
Start: 2023-03-27 | End: 2023-03-31 | Stop reason: HOSPADM

## 2023-03-27 RX ORDER — MORPHINE SULFATE 4 MG/ML
4 INJECTION, SOLUTION INTRAMUSCULAR; INTRAVENOUS ONCE
Status: COMPLETED | OUTPATIENT
Start: 2023-03-27 | End: 2023-03-27

## 2023-03-27 RX ORDER — LIDOCAINE 40 MG/G
CREAM TOPICAL
Status: DISCONTINUED | OUTPATIENT
Start: 2023-03-27 | End: 2023-03-31 | Stop reason: HOSPADM

## 2023-03-27 RX ORDER — PROCHLORPERAZINE MALEATE 10 MG
10 TABLET ORAL EVERY 6 HOURS PRN
Status: DISCONTINUED | OUTPATIENT
Start: 2023-03-27 | End: 2023-03-31 | Stop reason: HOSPADM

## 2023-03-27 RX ORDER — HYDROMORPHONE HCL IN WATER/PF 6 MG/30 ML
0.4 PATIENT CONTROLLED ANALGESIA SYRINGE INTRAVENOUS
Status: DISCONTINUED | OUTPATIENT
Start: 2023-03-27 | End: 2023-03-31 | Stop reason: HOSPADM

## 2023-03-27 RX ORDER — FLUTICASONE PROPIONATE 50 MCG
1 SPRAY, SUSPENSION (ML) NASAL DAILY
Status: DISCONTINUED | OUTPATIENT
Start: 2023-03-27 | End: 2023-03-31 | Stop reason: HOSPADM

## 2023-03-27 RX ORDER — IOPAMIDOL 755 MG/ML
74 INJECTION, SOLUTION INTRAVASCULAR ONCE
Status: COMPLETED | OUTPATIENT
Start: 2023-03-27 | End: 2023-03-27

## 2023-03-27 RX ORDER — ONDANSETRON 2 MG/ML
4 INJECTION INTRAMUSCULAR; INTRAVENOUS EVERY 6 HOURS PRN
Status: DISCONTINUED | OUTPATIENT
Start: 2023-03-27 | End: 2023-03-31 | Stop reason: HOSPADM

## 2023-03-27 RX ORDER — ONDANSETRON 4 MG/1
4 TABLET, ORALLY DISINTEGRATING ORAL EVERY 6 HOURS PRN
Status: DISCONTINUED | OUTPATIENT
Start: 2023-03-27 | End: 2023-03-31 | Stop reason: HOSPADM

## 2023-03-27 RX ORDER — SODIUM CHLORIDE 9 MG/ML
INJECTION, SOLUTION INTRAVENOUS CONTINUOUS
Status: DISCONTINUED | OUTPATIENT
Start: 2023-03-27 | End: 2023-03-28

## 2023-03-27 RX ADMIN — IOPAMIDOL 74 ML: 755 INJECTION, SOLUTION INTRAVENOUS at 10:06

## 2023-03-27 RX ADMIN — FLUTICASONE PROPIONATE 1 SPRAY: 50 SPRAY, METERED NASAL at 20:46

## 2023-03-27 RX ADMIN — MORPHINE SULFATE 4 MG: 4 INJECTION, SOLUTION INTRAMUSCULAR; INTRAVENOUS at 13:11

## 2023-03-27 RX ADMIN — MORPHINE SULFATE 4 MG: 4 INJECTION, SOLUTION INTRAMUSCULAR; INTRAVENOUS at 09:45

## 2023-03-27 RX ADMIN — SODIUM CHLORIDE 1000 ML: 9 INJECTION, SOLUTION INTRAVENOUS at 09:42

## 2023-03-27 RX ADMIN — DEXTROSE AND SODIUM CHLORIDE: 5; 900 INJECTION, SOLUTION INTRAVENOUS at 18:04

## 2023-03-27 RX ADMIN — SODIUM CHLORIDE: 9 INJECTION, SOLUTION INTRAVENOUS at 13:12

## 2023-03-27 RX ADMIN — HYDROMORPHONE HYDROCHLORIDE 0.2 MG: 0.2 INJECTION, SOLUTION INTRAMUSCULAR; INTRAVENOUS; SUBCUTANEOUS at 16:59

## 2023-03-27 RX ADMIN — HYDROMORPHONE HYDROCHLORIDE 0.2 MG: 0.2 INJECTION, SOLUTION INTRAMUSCULAR; INTRAVENOUS; SUBCUTANEOUS at 22:23

## 2023-03-27 RX ADMIN — ONDANSETRON 4 MG: 2 INJECTION INTRAMUSCULAR; INTRAVENOUS at 18:03

## 2023-03-27 RX ADMIN — ONDANSETRON 4 MG: 2 INJECTION INTRAMUSCULAR; INTRAVENOUS at 09:45

## 2023-03-27 RX ADMIN — SODIUM CHLORIDE 61 ML: 900 INJECTION INTRAVENOUS at 10:06

## 2023-03-27 RX ADMIN — PANTOPRAZOLE SODIUM 40 MG: 40 INJECTION, POWDER, FOR SOLUTION INTRAVENOUS at 18:04

## 2023-03-27 ASSESSMENT — ACTIVITIES OF DAILY LIVING (ADL)
ADLS_ACUITY_SCORE: 18
ADLS_ACUITY_SCORE: 35
ADLS_ACUITY_SCORE: 18
ADLS_ACUITY_SCORE: 35
ADLS_ACUITY_SCORE: 18
ADLS_ACUITY_SCORE: 35

## 2023-03-27 ASSESSMENT — ENCOUNTER SYMPTOMS
DYSURIA: 0
ABDOMINAL PAIN: 1
VOMITING: 1
DIARRHEA: 0
FREQUENCY: 0
BACK PAIN: 1
NAUSEA: 1

## 2023-03-27 NOTE — ED TRIAGE NOTES
Patient reports nausea and vomiting staring last night, abdominal pain that radiates to the back 7/10. Recent bowel obstruction in November. Patient also reports recent UTI and a cough that has persisted for the last week.      Triage Assessment     Row Name 03/27/23 0913       Respiratory WDL    Respiratory WDL cough;X    Cough Frequency frequent    Cough Type nonproductive       Cardiac WDL    Pulse Rate & Regularity radial pulse regular       Cognitive/Neuro/Behavioral WDL    Level of Consciousness alert    Orientation oriented x 4    Mood/Behavior calm;cooperative

## 2023-03-27 NOTE — PLAN OF CARE
Pt arrived to unit around 1730 & was settled. A/Ox4, very pleasant. VSS on RA ex HTN. C/o nausea, PRN zofran given x1. Dilaudid given prior to coming up to unit. NPO, no ice chips. Up independent but needs help unhooking NG tube from suction. NG to LIS, green output. R PIV infusing D5 NS @ 125 ml/hr. Discharge pending.

## 2023-03-27 NOTE — ED PROVIDER NOTES
History   Chief Complaint:  Nausea & Vomiting    The history is provided by the patient and the spouse.      Aidan Mays is a 42 year old female with a history of small bowel obstruction requiring surgical removal who presents with vomiting and abdominal pain. Aidan explains that her abdominal pain onset last night just after eating rice and soup for dinner. She reports she has vomited 5-6 times since abdominal pain onset. Her abdominal pain is generalized with pain more severe in the lower abdomen than the upper. Her abdominal pain is accompanied by back pain. Denies diarrhea, flatulence, and bowel movements since pain onset. Denies dysuria and urinary frequency. Denies dizziness, numbness, and tingling.     Of note, Aidan was prescribed Bactrim one month ago 23 after an e-visit for UTI.     Independent Historian:    - He reports that he ate the same meal for dinner last night. He is without symptoms. He states she has vomiting 6-7 times since symptoms onset. He notes that Aidan frequently has back pain following an epidural. He mentions that the whole family has been experiencing cough and sore throat.     Review of External Notes:   22 note by Dr. Johnson where she was diagnosed with a small bowel obstruction.  22 note by Dr. Castro for UTI and placed on bactrim    ROS:  Review of Systems   Gastrointestinal: Positive for abdominal pain, nausea and vomiting. Negative for diarrhea.   Genitourinary: Negative for dysuria and frequency.   Musculoskeletal: Positive for back pain.     Allergies:  The patient has no known allergies.     Medications:    Benzonatate   Colace   Estradiol    Past Medical History:    Endometriosis   Thyroid nodule   Small bowel obstruction    Past Surgical History:     section   Bilateral combined cystoscopy, insert catheter ureter   Fine needle aspiration  Left leg fracture repair   Total abdominal hysterectomy with right salpingo-oophorectomy    Exploratory laparotomy     Family History:    Mother- hypertension., fibroids     Social History:  Presents to the emergency department with her .   PCP: Sloane Castro MD    Physical Exam     Patient Vitals for the past 24 hrs:   BP Temp Temp src Pulse Resp SpO2   03/27/23 1105 124/84 -- -- -- -- 98 %   03/27/23 1050 -- -- -- -- -- 98 %   03/27/23 1035 121/75 -- -- -- -- 100 %   03/27/23 1005 130/77 -- -- -- -- 100 %   03/27/23 0932 135/88 -- -- 98 -- 99 %   03/27/23 0915 (!) 142/93 -- -- 105 -- 97 %   03/27/23 0910 (!) 137/100 98.1  F (36.7  C) Oral 100 20 98 %     Physical Exam  General:  Sitting on bed.  HENT:  No obvious trauma to head. Posterior oropharynx without erythema, uvula is midline and no soft palate petechiae. No swelling beneath tongue.  Right Ear:  External ear normal.   Left Ear:  External ear normal.   Nose:  Nose normal.   Eyes:  Conjunctivae and EOM are normal. Pupils are equal, round, and reactive.   Neck: Normal range of motion. Neck supple. No tracheal deviation present.   CV:  Normal heart sounds. No murmur heard.  Pulm/Chest: Effort normal and breath sounds normal.   Abd: Soft. No distension. Mild bilateral lower quadrant tenderness. There is no rigidity, no rebound and no guarding.   M/S: Normal range of motion.   Neuro: Awake and alert.   Skin: Skin is warm and dry. No rash noted. Not diaphoretic.   Psych: Normal mood and affect. Behavior is normal.     Emergency Department Course     Imaging:  CT Abdomen Pelvis w Contrast   Preliminary Result   IMPRESSION:    1. Multiple dilated small bowel loops, with the transition point in   the right mid abdomen, findings worrisome for small bowel obstruction.   2. Small amount of free fluid in the pelvis, indeterminate, could be   reactive.   3. 2.4 cm hypoattenuating area in the right hepatic lobe,   indeterminate, could represent hepatic hemangioma versus other hepatic   lesions, recommend further evaluation with contrast-enhanced MRI for    definitive characterization.            Report per radiology    Laboratory:  Labs Ordered and Resulted from Time of ED Arrival to Time of ED Departure   COMPREHENSIVE METABOLIC PANEL - Abnormal       Result Value    Sodium 142      Potassium 4.2      Chloride 103      Carbon Dioxide (CO2) 26      Anion Gap 13      Urea Nitrogen 12.7      Creatinine 0.62      Calcium 10.2 (*)     Glucose 146 (*)     Alkaline Phosphatase 97      AST 33      ALT 23      Protein Total 8.4 (*)     Albumin 4.6      Bilirubin Total 0.4      GFR Estimate >90     UA MACROSCOPIC WITH REFLEX TO MICRO AND CULTURE - Abnormal    Color Urine Light Yellow      Appearance Urine Clear      Glucose Urine Negative      Bilirubin Urine Negative      Ketones Urine 10 (*)     Specific Gravity Urine 1.005      Blood Urine Negative      pH Urine 7.0      Protein Albumin Urine 10 (*)     Urobilinogen Urine Normal      Nitrite Urine Negative      Leukocyte Esterase Urine Negative      Bacteria Urine Few (*)     Mucus Urine Present (*)     RBC Urine <1      WBC Urine <1      Squamous Epithelials Urine 4 (*)    CBC WITH PLATELETS AND DIFFERENTIAL - Abnormal    WBC Count 13.0 (*)     RBC Count 5.59 (*)     Hemoglobin 16.0 (*)     Hematocrit 48.3 (*)     MCV 86      MCH 28.6      MCHC 33.1      RDW 13.0      Platelet Count 331      % Neutrophils 92      % Lymphocytes 4      % Monocytes 4      % Eosinophils 0      % Basophils 0      % Immature Granulocytes 0      NRBCs per 100 WBC 0      Absolute Neutrophils 12.0 (*)     Absolute Lymphocytes 0.5 (*)     Absolute Monocytes 0.5      Absolute Eosinophils 0.0      Absolute Basophils 0.1      Absolute Immature Granulocytes 0.0      Absolute NRBCs 0.0     LIPASE - Normal    Lipase 33       Emergency Department Course & Assessments:    Interventions:  Medications   0.9% sodium chloride BOLUS (1,000 mLs Intravenous $New Bag 3/27/23 0942)     Followed by   sodium chloride 0.9% infusion (has no administration in time  range)   ondansetron (ZOFRAN) injection 4 mg (4 mg Intravenous $Given 3/27/23 0945)   morphine (PF) injection 4 mg (4 mg Intravenous $Given 3/27/23 0945)   iopamidol (ISOVUE-370) solution 74 mL (74 mLs Intravenous $Given 3/27/23 1006)   Saline Flush (61 mLs Intravenous $Given 3/27/23 1006)     Assessments:  0931 I obtained history and examined the patient as noted above.   1111 I rechecked the patient and explained findings. I discussed admission with the patient. All questions answered. Patient is requesting an NG placement as that helped her last time.     Independent Interpretation (X-rays, CTs, rhythm strip):  None    Consultations/Discussion of Management or Tests:  ED Course as of 03/27/23 1125   Mon Mar 27, 2023   1118 I consulted with Dr. Gutierrez, hospitalist, regarding the patient's history and presentation here in the emergency department who accepted the patient for admission.     Social Determinants of Health affecting care:   None    Disposition:  The patient was admitted to the hospital under the care of Dr. Gutierrez.     Impression & Plan      Medical Decision Making:  Aidan Mays is a very pleasant 42 year old female who presents for evaluation of vomiting. There has been decreased rectal gas and stool.  She has a leukocytosis and given her pain is concern for small bowel obstruction.  A CT was ordered.  Clinically this is consistent with bowel obstruction and CT confirms this as well. Patient has been resuscitated with IVF, and morphine and Zofran controlled her additional symptoms.  She reports with her prior small bowel obstruction that bowel rest alone did not improve her symptoms and after an NG was placed she showed improvement.  Because of this an NG was placed. Will admit to medicine for further cares and bowel rest. No indication at this point for emergency surgical consult. There are no signs of surgical emergencies or intraabdominal catastrophes such as volvulus, gut ischemia,  perforation, etc. of note, there is incidental finding of the lesion within the right lobe of the liver.  I reviewed this with her and her  and recommended that they have outpatient follow-up for further clarification of what this represents.  She agrees.  She was also clinically diagnosed with a UTI via a telehealth encounter and placed on antibiotics.  Since UA was not ordered at that time, urine analysis was ordered today and shows no signs of infection and CT shows no signs of pyelonephritis or ureteral stone.    Diagnosis:    ICD-10-CM    1. Small bowel obstruction (H)  K56.609       2. Nausea and vomiting, unspecified vomiting type  R11.2       3. Leukocytosis, unspecified type  D72.829       4. Lesion of right lobe of liver  K76.9         Scribe Disclosure:  Ana RICHARD, am serving as a scribe at 10:03 AM on 3/27/2023 to document services personally performed by Reddy Soliz DO based on my observations and the provider's statements to me.    3/27/2023   Reddy Soliz DO Anderson, Robert James, DO  03/27/23 1129

## 2023-03-27 NOTE — PROGRESS NOTES
RECEIVING UNIT ED HANDOFF REVIEW    ED Nurse Handoff Report was reviewed by: Juani Pinon RN on March 27, 2023 at 3:27 PM

## 2023-03-27 NOTE — ED NOTES
[facility]  ED Nurse Handoff Report    ED Chief complaint: Nausea & Vomiting      ED Diagnosis:   Final diagnoses:   None       Code Status: not addressed     Allergies: No Known Allergies    Patient Story: NV since last night, hx small bowel obst   Focused Assessment:  As above     Treatments and/or interventions provided: Morphine, NS, zofran, ng  Patient's response to treatments and/or interventions: slept after meds    To be done/followed up on inpatient unit:  unknown     Does this patient have any cognitive concerns?:  none     Activity level - Baseline/Home:  Independent  Activity Level - Current:   Unknown    Patient's Preferred language: English   Needed?: No    Isolation: None  Infection: Not Applicable  Patient tested for COVID 19 prior to admission:   Bariatric?: No    Vital Signs:   Vitals:    03/27/23 0910 03/27/23 0915 03/27/23 0932   BP: (!) 137/100 (!) 142/93 135/88   Pulse: 100 105 98   Resp: 20     Temp: 98.1  F (36.7  C)     TempSrc: Oral     SpO2: 98% 97% 99%       Cardiac Rhythm:     Was the PSS-3 completed:   Yes  What interventions are required if any?               Family Comments:   OBS brochure/video discussed/provided to patient/family:               Name of person given brochure if not patient:               Relationship to patient:     For the majority of the shift this patient's behavior was .   Behavioral interventions performed were .    ED NURSE PHONE NUMBER: [phone]

## 2023-03-27 NOTE — H&P
[facility]    History and Physical  Hospitalist       Date of Admission:  3/27/2023    Assessment & Plan   Aidan Mays is a 42 year old female with nausea vomiting and abdominal pain, CT abdomen indicates small bowel obstruction.  Principal Problem:    SBO (small bowel obstruction) (H)    Pelvic adhesions    Hx SBO    Leukocytosis, unspecified type    Nausea and vomiting, unspecified vomiting type  --- Patient presents with 1 day of abdominal pain, nausea and vomiting, of note 12/19 patient had developed small bowel obstruction, she was managed at [facility], she was taken to the OR on 12/13/2022 and underwent exploratory laparotomy with lysis of additions, since then she has been doing well until now.  Patient has history of prior pelvic surgery including hysterectomy.  --CT abdomen done here indicates a little dilated small bowel loops with transition point in the small bowel.  --Keep n.p.o., admit to inpatient, continue IV fluids  --We will request general surgery consult here, continue conservative management, patient requested an NG tube, will place NG in place it for intermittent suction.  --Added IV narcotics for pain control.  Encourage ambulation    Nodule on liver possible hemangioma need mri outpatient    Lesion of right lobe of liver  --Noted on CT abdomen here, possibly an hemangioma, will need outpatient work-up, this was discussed with the patient by the ER physician, I did reiterate the same.  Hypercalcemia  --Continue hydration and repeat calcium levels in AM.  DVT Prophylaxis: Low Risk/Ambulatory with no VTE prophylaxis indicated and Pneumatic Compression Devices  Code Status: Full Code    Disposition: Expected discharge in 2 to 3 days    Rosa Maria Gutierrez MD    Primary Care Physician   [facility]    Chief Complaint       History is obtained from the patient    History of Present Illness   Aidan Mays is a 42 year old female with a history of  endometriosis, status post hysterectomy and other pelvic surgeries presents to the emergency department with concerns of nausea vomiting and abdominal pain.  CT abdomen done here indicates small bowel obstruction.  Patient has history of endometriosis and underwent hysterectomy few years ago, following that she had another episode of small bowel obstruction  in December 2022.  Current symptoms came on suddenly, she had a supper and started having some nausea after that, overnight her abdominal pain and nausea got worse and she presented the next morning to the ER for evaluation.  This is similar to her symptoms last time she had adhesiolysis, both times for hysterectomy and her exploratory laparotomy were open procedures.  Labs reviewed, white count is mildly elevated, BMP within normal limit, calcium is elevated at 10.2 possibly secondary to dehydration and reduced oral intake.  Past Medical History    I have reviewed this patient's medical history and updated it with pertinent information if needed.   Past Medical History:   Diagnosis Date     Endometriosis      Pityriasis rosea 2/2016     Thyroid nodule 2/3/2011    left complex nodule       Past Surgical History   I have reviewed this patient's surgical history and updated it with pertinent information if needed.  Past Surgical History:   Procedure Laterality Date     C/SECTION, LOW TRANSVERSE      twins     COMBINED CYSTOSCOPY, INSERT CATHETER URETER Bilateral 7/13/2022    Procedure: CYSTOSCOPY,PLACING BILATERAL  URETERAL CATHETERS;  Surgeon: Edouard Cummins MD;  Location: [facility] OR     FINE NEEDLE ASPIRATION WITHOUT IMAGING GUIDANCE  02/21/2011    left thyroid nodule     FRACTURE SURGERY Left     leg     HYSTERECTOMY TOTAL ABDOMINAL, BILATERAL SALPINGO-OOPHORECTOMY, COMBINED Right 7/13/2022    Procedure: exploratory laparotomy ; total abdominal hysterectomy ; right salpingo oophorectomy, SUTURE REPAIR OF THE BLADDER, and removal of ureteral catheters, lysis of  adhesions, bilateral ureterolysis;  Surgeon: Jennifer Sanchez MD;  Location:  OR     LAPAROTOMY EXPLORATORY N/A 01/06/2022    Procedure: Laparotomy exploratory;  Surgeon: Scotty Garcia MD;  Location:  OR       Prior to Admission Medications   Prior to Admission Medications   Prescriptions Last Dose Informant Patient Reported? Taking?   docusate sodium (COLACE) 100 MG capsule 3/26/2023 Self Yes Yes   Sig: Take 1 capsule (100 mg) by mouth 2 times daily as needed for constipation   estradiol (ESTRACE) 1 MG tablet 3/26/2023  No Yes   Sig: Take 1 tablet (1 mg) by mouth daily      Facility-Administered Medications: None     Allergies   No Known Allergies    Social History   I have reviewed this patient's social history and updated it with pertinent information if needed. Aidan Mays  reports that she has never smoked. She has never used smokeless tobacco. She reports that she does not drink alcohol and does not use drugs.    Family History   I have reviewed this patient's family history and updated it with pertinent information if needed.   Family History   Problem Relation Age of Onset     Fibroids Mother      Hypertension Mother      Breast Cancer Maternal Aunt 62       Review of Systems   The 10 point Review of Systems is negative other than noted in the HPI or here.     Physical Exam   Temp: 98.1  F (36.7  C) Temp src: Oral BP: 124/84 Pulse: 98   Resp: 20 SpO2: 98 % O2 Device: None (Room air)    Vital Signs with Ranges  Temp:  [98.1  F (36.7  C)] 98.1  F (36.7  C)  Pulse:  [] 98  Resp:  [20] 20  BP: (121-142)/() 124/84  SpO2:  [97 %-100 %] 98 %  0 lbs 0 oz    Constitutional: Awake, alert, cooperative, no apparent distress.  Eyes: Conjunctiva and pupils examined and normal.  HEENT: Moist mucous membranes, normal dentition.  Respiratory: Clear to auscultation bilaterally, no crackles or wheezing.  Cardiovascular: Regular rate and rhythm, normal S1 and S2, and no murmur noted.  GI:  Distended, tender, bowel sounds are hyperactive  Lymph/Hematologic: No anterior cervical or supraclavicular adenopathy.  Skin: No rashes, no cyanosis, no edema.  Musculoskeletal: No joint swelling, erythema or tenderness.  Neurologic: Cranial nerves 2-12 intact, normal strength and sensation.  Psychiatric: Alert, oriented to person, place and time, no obvious anxiety or depression.    Data   Data reviewed today:  I personally reviewed labs and imaging below  Recent Labs   Lab 03/27/23  0923   WBC 13.0*   HGB 16.0*   MCV 86         POTASSIUM 4.2   CHLORIDE 103   CO2 26   BUN 12.7   CR 0.62   ANIONGAP 13   CRISPIN 10.2*   *   ALBUMIN 4.6   PROTTOTAL 8.4*   BILITOTAL 0.4   ALKPHOS 97   ALT 23   AST 33   LIPASE 33       Imaging:  Recent Results (from the past 24 hour(s))   CT Abdomen Pelvis w Contrast    Narrative    CT ABDOMEN/PELVIS WITH CONTRAST March 27, 2023 10:21 AM    HISTORY: Bilateral lower quadrant pain and tenderness.    TECHNIQUE: CT scan obtained of the abdomen, and pelvis with IV  contrast. 74mL Isovue-370 IV injected. Radiation dose for this scan  was reduced using automated exposure control, adjustment of the mA  and/or kV according to patient size, or iterative reconstruction  technique.    COMPARISON: None available.    FINDINGS:    Lower chest: Mild basilar pulmonary opacities, likely atelectasis.    Abdomen/pelvis:    Hepatobiliary: 2.4 cm hypoattenuating area in hepatic segment 7  (series 4 image 49), indeterminate, could represent hepatic  hemangioma. The gallbladder is unremarkable.    Pancreas: No main pancreatic ductal dilatation or definite solid  pancreatic mass.    Spleen: No splenomegaly.    Adrenal glands: No adrenal nodules.    Kidneys: No radiodense kidney/ureteral stones or hydronephrosis in the  kidney.    Bowel: Multiple dilated small bowel loops with transition point in the  right midabdomen (series 4 image 98). The appendix is visualized and  appears  normal.    Peritoneum: Small amount of free fluid in the pelvis, indeterminate,  could be reactive.    Pelvic organs: Unremarkable.    Vascular: Unremarkable.    Lymph nodes: No significant abdominopelvic lymphadenopathy.    Bones and soft tissue: No suspicious osseous lesion.      Impression    IMPRESSION:   1. Multiple dilated small bowel loops, with the transition point in  the right mid abdomen, findings worrisome for small bowel obstruction.  2. Small amount of free fluid in the pelvis, indeterminate, could be  reactive.  3. 2.4 cm hypoattenuating area in the right hepatic lobe,  indeterminate, could represent hepatic hemangioma versus other hepatic  lesions, recommend further evaluation with contrast-enhanced MRI for  definitive characterization.

## 2023-03-27 NOTE — PHARMACY-ADMISSION MEDICATION HISTORY
Pharmacy Medication History  Admission medication history interview status for the 3/27/2023  admission is complete. See EPIC admission navigator for prior to admission medications     Location of Interview: Patient room  Medication history sources: Patient and Surescripts    Significant changes made to the medication list:  Removed: benzonatate, senna-s    In the past week, patient estimated taking medication this percent of the time: greater than 90%    Medication Affordability:  Not including over the counter (OTC) medications, was there a time in the past 12 months when you did not take your medications as prescribed because of cost?: No    Additional medication history information:   None    Medication reconciliation completed by provider prior to medication history? No    Time spent in this activity: 10 minutes    Prior to Admission medications    Medication Sig Last Dose Taking? Auth Provider Long Term End Date   docusate sodium (COLACE) 100 MG capsule Take 1 capsule (100 mg) by mouth 2 times daily as needed for constipation 3/26/2023 Yes Sloane Castro MD     estradiol (ESTRACE) 1 MG tablet Take 1 tablet (1 mg) by mouth daily 3/26/2023 Yes Dayami Rojo PA-C Yes        The information provided in this note is only as accurate as the sources available at the time of update(s)

## 2023-03-28 ENCOUNTER — APPOINTMENT (OUTPATIENT)
Dept: GENERAL RADIOLOGY | Facility: CLINIC | Age: 43
DRG: 390 | End: 2023-03-28
Attending: PHYSICIAN ASSISTANT
Payer: COMMERCIAL

## 2023-03-28 LAB
ANION GAP SERPL CALCULATED.3IONS-SCNC: 9 MMOL/L (ref 7–15)
BUN SERPL-MCNC: 12.4 MG/DL (ref 6–20)
CALCIUM SERPL-MCNC: 8.7 MG/DL (ref 8.6–10)
CHLORIDE SERPL-SCNC: 111 MMOL/L (ref 98–107)
CREAT SERPL-MCNC: 0.63 MG/DL (ref 0.51–0.95)
DEPRECATED HCO3 PLAS-SCNC: 24 MMOL/L (ref 22–29)
GFR SERPL CREATININE-BSD FRML MDRD: >90 ML/MIN/1.73M2
GLUCOSE SERPL-MCNC: 150 MG/DL (ref 70–99)
POTASSIUM SERPL-SCNC: 3.5 MMOL/L (ref 3.4–5.3)
SODIUM SERPL-SCNC: 144 MMOL/L (ref 136–145)

## 2023-03-28 PROCEDURE — 999N000065 XR ABDOMEN PORT 1 VIEW

## 2023-03-28 PROCEDURE — 258N000003 HC RX IP 258 OP 636: Performed by: INTERNAL MEDICINE

## 2023-03-28 PROCEDURE — 120N000001 HC R&B MED SURG/OB

## 2023-03-28 PROCEDURE — C9113 INJ PANTOPRAZOLE SODIUM, VIA: HCPCS | Performed by: INTERNAL MEDICINE

## 2023-03-28 PROCEDURE — 80048 BASIC METABOLIC PNL TOTAL CA: CPT | Performed by: INTERNAL MEDICINE

## 2023-03-28 PROCEDURE — 258N000001 HC RX 258: Performed by: HOSPITALIST

## 2023-03-28 PROCEDURE — 36415 COLL VENOUS BLD VENIPUNCTURE: CPT | Performed by: INTERNAL MEDICINE

## 2023-03-28 PROCEDURE — 99232 SBSQ HOSP IP/OBS MODERATE 35: CPT | Performed by: HOSPITALIST

## 2023-03-28 PROCEDURE — 250N000011 HC RX IP 250 OP 636: Performed by: INTERNAL MEDICINE

## 2023-03-28 PROCEDURE — 99221 1ST HOSP IP/OBS SF/LOW 40: CPT | Mod: FS | Performed by: PHYSICIAN ASSISTANT

## 2023-03-28 RX ORDER — NALOXONE HYDROCHLORIDE 0.4 MG/ML
0.2 INJECTION, SOLUTION INTRAMUSCULAR; INTRAVENOUS; SUBCUTANEOUS
Status: DISCONTINUED | OUTPATIENT
Start: 2023-03-28 | End: 2023-03-31 | Stop reason: HOSPADM

## 2023-03-28 RX ORDER — DEXTROSE MONOHYDRATE, SODIUM CHLORIDE, AND POTASSIUM CHLORIDE 50; 1.49; 9 G/1000ML; G/1000ML; G/1000ML
INJECTION, SOLUTION INTRAVENOUS CONTINUOUS
Status: DISCONTINUED | OUTPATIENT
Start: 2023-03-28 | End: 2023-03-31 | Stop reason: HOSPADM

## 2023-03-28 RX ORDER — NALOXONE HYDROCHLORIDE 0.4 MG/ML
0.4 INJECTION, SOLUTION INTRAMUSCULAR; INTRAVENOUS; SUBCUTANEOUS
Status: DISCONTINUED | OUTPATIENT
Start: 2023-03-28 | End: 2023-03-31 | Stop reason: HOSPADM

## 2023-03-28 RX ADMIN — HYDROMORPHONE HYDROCHLORIDE 0.2 MG: 0.2 INJECTION, SOLUTION INTRAMUSCULAR; INTRAVENOUS; SUBCUTANEOUS at 04:47

## 2023-03-28 RX ADMIN — ONDANSETRON 4 MG: 2 INJECTION INTRAMUSCULAR; INTRAVENOUS at 06:26

## 2023-03-28 RX ADMIN — FLUTICASONE PROPIONATE 1 SPRAY: 50 SPRAY, METERED NASAL at 08:16

## 2023-03-28 RX ADMIN — HYDROMORPHONE HYDROCHLORIDE 0.2 MG: 0.2 INJECTION, SOLUTION INTRAMUSCULAR; INTRAVENOUS; SUBCUTANEOUS at 07:26

## 2023-03-28 RX ADMIN — DEXTROSE AND SODIUM CHLORIDE: 5; 900 INJECTION, SOLUTION INTRAVENOUS at 02:32

## 2023-03-28 RX ADMIN — ONDANSETRON 4 MG: 2 INJECTION INTRAMUSCULAR; INTRAVENOUS at 00:11

## 2023-03-28 RX ADMIN — POTASSIUM CHLORIDE, DEXTROSE MONOHYDRATE AND SODIUM CHLORIDE: 150; 5; 900 INJECTION, SOLUTION INTRAVENOUS at 22:16

## 2023-03-28 RX ADMIN — PANTOPRAZOLE SODIUM 40 MG: 40 INJECTION, POWDER, FOR SOLUTION INTRAVENOUS at 08:16

## 2023-03-28 RX ADMIN — HYDROMORPHONE HYDROCHLORIDE 0.2 MG: 0.2 INJECTION, SOLUTION INTRAMUSCULAR; INTRAVENOUS; SUBCUTANEOUS at 00:45

## 2023-03-28 RX ADMIN — PROCHLORPERAZINE EDISYLATE 10 MG: 5 INJECTION INTRAMUSCULAR; INTRAVENOUS at 07:30

## 2023-03-28 RX ADMIN — POTASSIUM CHLORIDE, DEXTROSE MONOHYDRATE AND SODIUM CHLORIDE: 150; 5; 900 INJECTION, SOLUTION INTRAVENOUS at 12:18

## 2023-03-28 RX ADMIN — DEXTROSE AND SODIUM CHLORIDE: 5; 900 INJECTION, SOLUTION INTRAVENOUS at 10:52

## 2023-03-28 ASSESSMENT — ACTIVITIES OF DAILY LIVING (ADL)
ADLS_ACUITY_SCORE: 18

## 2023-03-28 NOTE — PLAN OF CARE
A/Ox4, VSS on RA ex HTN. Pt received PRN compazine & IV dilaudid this am at shift change, no further complaints or PRN's throughout this shift. Heat packs applied to back. NPO ex ice chips. NG to LIS, green output. Abd xray completed to verify placement. Up ind, pt calls prior to unhook NG  from suction. Ambulated in fay x3 today, encouraging activity. BS hypo but passed gas multiple times this afternoon. Cont B/B, no BM. R PIV infusing D5 + NS + 20 K+ @ 100 ml/hr. Surgery consulted - see note. Discharge pending improvement.

## 2023-03-28 NOTE — PROGRESS NOTES
[facility]  Hospitalist Progress Note        Fransico Amador MD   03/28/2023        Interval History:        - Reports feeling fatigued, pain abdomen better; reports no bowel movement or passing gas  - NG tube in place with bilious output; continues to be NPO except ice chips  - reviewed abd xray 3/28 with NG tube in good position in the stomach, also noted dilated loops of small bowel right mid abdomen similar to previous.  - evaluated by general surgery, planning for gastrgraffin challenge  - will continue with IV fluids until taking adequate PO; switch to D5 NS with 20 mew KCL at 100 ml/hr         Assessment and Plan:        Aidan Mays is a 42 year old female with PMH of abdominal hysterectomy , h/o SBO (requiring exploratory laparotomy 12/2022) who presented to ED with nausea vomiting and abdominal pain, CT abdomen indicates small bowel obstruction. Admitted inpatient 3/27/23.    Recurrent SBO (small bowel obstruction)     Pelvic adhesions    Hx SBO (requiring exploratory laparotomy and adhesiolysis 12/2022)  mild hypercalcemia  - presented with one day of symptoms with nausea vomiting and abdominal pain; noted prior history of hysterectomy and SBO requiring exploratory laparotomy 12/2022  --CT abdomen indicates a little dilated small bowel loops with transition point in the small bowel.  - NG tube was placed with bilious drainage  - continues to be NPO except ice chips  - reviewed abd xray 3/28 with NG tube in good position in the stomach, also noted dilated loops of small bowel right mid abdomen similar to previous.  - evaluated by general surgery, planning for gastrgraffin challenge  - will continue with IV fluids until taking adequate PO; switch to D5 NS with 20 mew KCL at 100 ml/hr  -calcium on admission 10.2-- 8.7, improved with IV hydration  -PRN pain and nausea medications      Leukocytosis, unspecified type  - mild leukocytosis on admission with WBC 13, likely stress response;  will monitor CBC      Nodule on liver possible hemangioma need mri outpatient    Lesion of right lobe of liver  - CT : 2.4 cm hypoattenuating area in the right hepatic lobe,  indeterminate, could represent hepatic hemangioma versus other hepatic  lesions, recommend further evaluation with contrast-enhanced MRI for  definitive characterization.  - incidentally noted on CT abdomen here, possibly an hemangioma, will need outpatient work-up    DVT Prophylaxis: Low Risk/Ambulatory with no VTE prophylaxis indicated and Pneumatic Compression Devices    Code Status: Full Code     Disposition: Expected discharge in 2 to 3 days pending improvement in bowel obstruction and PO tolerance/surgery clearance    Diet: NPO for Medical/Clinical Reasons Except for: Ice Chips    Clinically Significant Risk Factors Present on Admission           # Hypercalcemia: Highest Ca = 10.2 mg/dL in last 2 days, will monitor as appropriate                        Page Me (7 am to 6 pm)              Physical Exam:      Blood pressure 125/88, pulse 89, temperature 97.4  F (36.3  C), temperature source Axillary, resp. rate 18, SpO2 95 %.  There were no vitals filed for this visit.  Vital Signs with Ranges  Temp:  [97.4  F (36.3  C)-99.1  F (37.3  C)] 97.4  F (36.3  C)  Pulse:  [] 89  Resp:  [16-20] 18  BP: (115-142)/() 125/88  SpO2:  [95 %-100 %] 95 %  I/O's Last 24 hours  No intake/output data recorded.    Constitutional: Alert, awake and oriented X 3; resting comfortably in no apparent distress; looks fatigued      NG tube in place with bilious drainage    Oral cavity: Moist mucosa   Cardiovascular: Normal s1 s2, regular rate and rhythm, no murmur   Lungs: B/l clear to auscultation, no wheezes or crepitations   Abdomen: Soft, nt, nd, no guarding, rigidity or rebound; BS present and hyper exaggerated    LE : No edema   Musculoskeletal/Neuro Power 5/5 in all extremities; No focal neurological deficits noted   Psychiatry: normal mood and  affect                Medications:          fluticasone  1 spray Both Nostrils Daily     pantoprazole  40 mg Intravenous Daily with breakfast     sodium chloride (PF)  3 mL Intracatheter Q8H     PRN Meds: HYDROmorphone, HYDROmorphone, lidocaine 4%, lidocaine (buffered or not buffered), melatonin, ondansetron **OR** ondansetron, prochlorperazine **OR** prochlorperazine **OR** prochlorperazine, sodium chloride (PF)         Data:      All new lab and imaging data was reviewed.   Recent Labs   Lab Test 03/27/23  0923 07/14/22  0711 07/05/22  0934   WBC 13.0*  --  5.2   HGB 16.0* 11.3* 14.9   MCV 86  --  92    179 208      Recent Labs   Lab Test 03/27/23  0923 07/15/22  0812 07/15/22  0628 07/14/22  0711 07/13/22  1246 07/05/22  0934     --   --  134  --  138   POTASSIUM 4.2  --   --  3.9  --  4.4   CHLORIDE 103  --   --  108  --  106   CO2 26  --   --  20  --  26   BUN 12.7  --   --  18  --  11   CR 0.62  --   --  0.74 0.79 0.74   ANIONGAP 13  --   --  6  --  6   CRISPIN 10.2*  --   --  8.0*  --  9.3   * 84 84 92  92  --  95     No lab results found.    Invalid input(s): TROP, TROPONINIES

## 2023-03-28 NOTE — CONSULTS
General Surgery Consultation  We are asked by Dr. Rosa Maria Gutierrez to see Aidan Mays in consultation regarding small bowel obstruction, recent laparotomy.    Aidan Mays  YOB: 1980    Age: 42 year old  MRN#: [id number]    Date of Admission: 3/27/2023  Surgeon:   Daniel Blunt MD                  Chief Complaint:   Abdominal pain         History of Present Illness:   This patient is a 42 year old female with a history of small bowel obstruction who presented to the [facility] ED yesterday for evaluation of acute onset of abdominal pain, nausea, and vomiting. These symptoms started 2 days ago after eating some soup and rice for dinner. She reports generalized abdominal with radiation of pain to her back and multiple episodes of emesis. Blayne tells me that this is her second episode of small bowel obstruction. In 2022, Aidan was admitted to an outside facility with small bowel obstruction. She failed nonoperative management and was taken to the OR on 22 for exploratory laparotomy with lysis of adhesions. Per her report and chart review, she had an uneventful recovery and was discharged home in stable condition. She has not had any issues with her bowels or abdominal pain until this current episode. She denies passing any flatus since onset of pain. An NG tube was placed yesterday, and she does feel some improvement in pain and nausea.     Upon further chart review, I see that Blayne's total abdominal hysterectomy with right salpingo-oophorectomy in 2022 was an exploratory laparotomy with significant lysis of adhesions for severe pelvic adhesive disease. This was performed for longstanding uterine leiomyoma, endometriosis. She also had her left ovary removed prior to  at some point and also previously underwent  section. History is obtained from the patient and chart. Patient denies any known personal or family history of bleeding  disorder, clotting disorder, anesthesia reaction.         Past Medical History:    has a past medical history of Endometriosis, Pityriasis rosea (2/2016), and Thyroid nodule (2/3/2011).          Past Surgical History:     Past Surgical History:   Procedure Laterality Date     C/SECTION, LOW TRANSVERSE      twins     COMBINED CYSTOSCOPY, INSERT CATHETER URETER Bilateral 7/13/2022    Procedure: CYSTOSCOPY,PLACING BILATERAL  URETERAL CATHETERS;  Surgeon: Edouard Cummins MD;  Location:  OR     FINE NEEDLE ASPIRATION WITHOUT IMAGING GUIDANCE  02/21/2011    left thyroid nodule     FRACTURE SURGERY Left     leg     HYSTERECTOMY TOTAL ABDOMINAL, BILATERAL SALPINGO-OOPHORECTOMY, COMBINED Right 7/13/2022    Procedure: exploratory laparotomy ; total abdominal hysterectomy ; right salpingo oophorectomy, SUTURE REPAIR OF THE BLADDER, and removal of ureteral catheters, lysis of adhesions, bilateral ureterolysis;  Surgeon: Jennifer Sanchez MD;  Location:  OR     LAPAROTOMY EXPLORATORY N/A 01/06/2022    Procedure: Laparotomy exploratory;  Surgeon: Scotty Garcia MD;  Location:  OR            Medications:     Prior to Admission medications    Medication Sig Start Date End Date Taking? Authorizing Provider   docusate sodium (COLACE) 100 MG capsule Take 1 capsule (100 mg) by mouth 2 times daily as needed for constipation 6/6/22  Yes Sloane Castro MD   estradiol (ESTRACE) 1 MG tablet Take 1 tablet (1 mg) by mouth daily 7/14/22  Yes Dayami Rojo PA-C            Allergies:   No Known Allergies         Social History:     Social History     Tobacco Use     Smoking status: Never     Smokeless tobacco: Never   Substance Use Topics     Alcohol use: No     Alcohol/week: 0.0 standard drinks             Review of Systems:   Brief ROS is negative other than noted in the HPI.         Physical Exam:   Blood pressure 125/88, pulse 89, temperature 97.4  F (36.3  C), temperature source Axillary, resp. rate 18, SpO2 95 %.  No  intake/output data recorded.    General - This is a well developed, well nourished female in no apparent distress. Alert and oriented x 3  Head - normocephalic, atraumatic  Eyes - no scleral icterus, extraocular movements intact  Neck - supple without masses, trachea midline, no lymphadenopathy or thyromegaly  Respiratory - lungs clear to auscultation bilaterally without wheezes, rales or rhonchi   Cardiovascular - regular rate and rhythm; no obvious murmurs  Abdomen - Distended, soft. Mild tenderness to palpation throughout. No focal tenderness. No palpable masses or organomegaly. No rebound or guarding. Surgical scars consistent with history.  Extremities - Moves all extremities. Warm without edema. No calf tenderness  Neurologic - Nonfocal          Data:   Labs:  Recent Labs   Lab Test 03/27/23  0923 07/14/22  0711 07/05/22  0934   WBC 13.0*  --  5.2   HGB 16.0* 11.3* 14.9   HCT 48.3*  --  44.0    179 208     Recent Labs   Lab Test 03/28/23  0746 03/27/23 0923 07/14/22  0711   POTASSIUM 3.5 4.2 3.9   CHLORIDE 111* 103 108   CO2 24 26 20   BUN 12.4 12.7 18   CR 0.63 0.62 0.74     Recent Labs   Lab Test 03/27/23  0923   BILITOTAL 0.4   ALT 23   AST 33   ALKPHOS 97   LIPASE 33     Recent Labs   Lab Test 03/28/23  0746 03/27/23  0923 07/14/22  0711   CRISPIN 8.7 10.2* 8.0*     Recent Labs   Lab Test 03/28/23  0746 03/27/23  1050 03/27/23 0923 07/19/22  2204 07/14/22  0711   ANIONGAP 9  --  13  --  6   PROTEIN  --  10*  --  70*  --    ALBUMIN  --   --  4.6  --   --        CT scan of the abdomen:   FINDINGS:     Lower chest: Mild basilar pulmonary opacities, likely atelectasis.     Abdomen/pelvis:     Hepatobiliary: 2.4 cm hypoattenuating area in hepatic segment 7  (series 4 image 49), indeterminate, could represent hepatic  hemangioma. The gallbladder is unremarkable.     Pancreas: No main pancreatic ductal dilatation or definite solid  pancreatic mass.     Spleen: No splenomegaly.     Adrenal glands: No adrenal  nodules.     Kidneys: No radiodense kidney/ureteral stones or hydronephrosis in the  kidney.     Bowel: Multiple dilated small bowel loops with transition point in the  right midabdomen (series 4 image 98). The appendix is visualized and  appears normal.     Peritoneum: Small amount of free fluid in the pelvis, indeterminate,  could be reactive.     Pelvic organs: Unremarkable.     Vascular: Unremarkable.     Lymph nodes: No significant abdominopelvic lymphadenopathy.     Bones and soft tissue: No suspicious osseous lesion.                                                                      IMPRESSION:   1. Multiple dilated small bowel loops, with the transition point in  the right mid abdomen, findings worrisome for small bowel obstruction.  2. Small amount of free fluid in the pelvis, indeterminate, could be  reactive.  3. 2.4 cm hypoattenuating area in the right hepatic lobe,  indeterminate, could represent hepatic hemangioma versus other hepatic  lesions, recommend further evaluation with contrast-enhanced MRI for  definitive characterization.           Assessment:   Aidan Mays is a 42 year old female with recurrent small bowel obstruction, likely related to abdominal/pelvic adhesions          Plan:   - No indication for urgent surgical intervention at this time. Hopefully we can avoid a reoperation and resolve this obstruction with conservative management. Aidan is in agreement. All of her questions were answered today.  - Continue bowel rest with NG tube to LIS. OK for ice chips, otherwise NPO. Continue maintenance IV fluids, pain meds and anti-emetics available as needed  - Will obtain XR to verify NG placement  - Consider NG clamping tomorrow pending NG output and improvement in symptoms, possible gastrografin challenge in the next couple days as well.  - Minimize narcotics, encourage frequent out of bed and ambulation to promote bowel function. Wear PCDs while resting for DVT ppx  - Protonix IV  daily for GI ppx  - Medical management per hospitalist    Time spent: 60 minutes total time spent on the date of this encounter doing: chart review, review of test results, patient visit/obtaining a history, physical exam, education, counseling, developing plan of care, and documenting.      I have discussed the history, physical, and plan with Dr. Blunt who will independently interview and examine the patient and update the stated plan as indicated.        Echo Ortiz PA-C  [facility]  [phone]

## 2023-03-28 NOTE — PLAN OF CARE
0060 - 1932    A&Ox4. VSS on RA. C/o 9/10 abdominal and back pain - managed with PRN IV dilaudid x3 and heat packs. PRN zofran given x2 for nausea. NG tube on low-intermittent suction in place with green output. NPO. Up SBA. Continent of B/B. R PIV infusing D5 + NaCl @ 125 mL/hr. Surgery consulted. Discharge pending improvement.

## 2023-03-29 LAB
ANION GAP SERPL CALCULATED.3IONS-SCNC: 6 MMOL/L (ref 7–15)
BASOPHILS # BLD AUTO: 0 10E3/UL (ref 0–0.2)
BASOPHILS NFR BLD AUTO: 1 %
BUN SERPL-MCNC: 7.5 MG/DL (ref 6–20)
CALCIUM SERPL-MCNC: 8.7 MG/DL (ref 8.6–10)
CHLORIDE SERPL-SCNC: 110 MMOL/L (ref 98–107)
CREAT SERPL-MCNC: 0.58 MG/DL (ref 0.51–0.95)
DEPRECATED HCO3 PLAS-SCNC: 25 MMOL/L (ref 22–29)
EOSINOPHIL # BLD AUTO: 0.2 10E3/UL (ref 0–0.7)
EOSINOPHIL NFR BLD AUTO: 3 %
ERYTHROCYTE [DISTWIDTH] IN BLOOD BY AUTOMATED COUNT: 13.1 % (ref 10–15)
GFR SERPL CREATININE-BSD FRML MDRD: >90 ML/MIN/1.73M2
GLUCOSE SERPL-MCNC: 117 MG/DL (ref 70–99)
HCT VFR BLD AUTO: 36 % (ref 35–47)
HGB BLD-MCNC: 11.8 G/DL (ref 11.7–15.7)
IMM GRANULOCYTES # BLD: 0 10E3/UL
IMM GRANULOCYTES NFR BLD: 1 %
LYMPHOCYTES # BLD AUTO: 2 10E3/UL (ref 0.8–5.3)
LYMPHOCYTES NFR BLD AUTO: 34 %
MCH RBC QN AUTO: 29.4 PG (ref 26.5–33)
MCHC RBC AUTO-ENTMCNC: 32.8 G/DL (ref 31.5–36.5)
MCV RBC AUTO: 90 FL (ref 78–100)
MONOCYTES # BLD AUTO: 0.4 10E3/UL (ref 0–1.3)
MONOCYTES NFR BLD AUTO: 7 %
NEUTROPHILS # BLD AUTO: 3.2 10E3/UL (ref 1.6–8.3)
NEUTROPHILS NFR BLD AUTO: 54 %
NRBC # BLD AUTO: 0 10E3/UL
NRBC BLD AUTO-RTO: 0 /100
PLATELET # BLD AUTO: 221 10E3/UL (ref 150–450)
POTASSIUM SERPL-SCNC: 3.7 MMOL/L (ref 3.4–5.3)
RBC # BLD AUTO: 4.02 10E6/UL (ref 3.8–5.2)
SODIUM SERPL-SCNC: 141 MMOL/L (ref 136–145)
WBC # BLD AUTO: 5.8 10E3/UL (ref 4–11)

## 2023-03-29 PROCEDURE — 80048 BASIC METABOLIC PNL TOTAL CA: CPT | Performed by: HOSPITALIST

## 2023-03-29 PROCEDURE — 36415 COLL VENOUS BLD VENIPUNCTURE: CPT | Performed by: HOSPITALIST

## 2023-03-29 PROCEDURE — 258N000001 HC RX 258: Performed by: HOSPITALIST

## 2023-03-29 PROCEDURE — 99232 SBSQ HOSP IP/OBS MODERATE 35: CPT | Performed by: INTERNAL MEDICINE

## 2023-03-29 PROCEDURE — 85025 COMPLETE CBC W/AUTO DIFF WBC: CPT | Performed by: HOSPITALIST

## 2023-03-29 PROCEDURE — 99231 SBSQ HOSP IP/OBS SF/LOW 25: CPT | Mod: FS | Performed by: PHYSICIAN ASSISTANT

## 2023-03-29 PROCEDURE — 250N000011 HC RX IP 250 OP 636: Performed by: INTERNAL MEDICINE

## 2023-03-29 PROCEDURE — 250N000013 HC RX MED GY IP 250 OP 250 PS 637: Performed by: INTERNAL MEDICINE

## 2023-03-29 PROCEDURE — C9113 INJ PANTOPRAZOLE SODIUM, VIA: HCPCS | Performed by: INTERNAL MEDICINE

## 2023-03-29 PROCEDURE — 120N000001 HC R&B MED SURG/OB

## 2023-03-29 RX ORDER — ACETAMINOPHEN 650 MG/1
650 SUPPOSITORY RECTAL EVERY 4 HOURS PRN
Status: DISCONTINUED | OUTPATIENT
Start: 2023-03-29 | End: 2023-03-31 | Stop reason: HOSPADM

## 2023-03-29 RX ORDER — ACETAMINOPHEN 325 MG/1
650 TABLET ORAL EVERY 4 HOURS PRN
Status: DISCONTINUED | OUTPATIENT
Start: 2023-03-29 | End: 2023-03-31 | Stop reason: HOSPADM

## 2023-03-29 RX ADMIN — ACETAMINOPHEN 650 MG: 325 TABLET, FILM COATED ORAL at 10:40

## 2023-03-29 RX ADMIN — PANTOPRAZOLE SODIUM 40 MG: 40 INJECTION, POWDER, FOR SOLUTION INTRAVENOUS at 08:38

## 2023-03-29 RX ADMIN — POTASSIUM CHLORIDE, DEXTROSE MONOHYDRATE AND SODIUM CHLORIDE: 150; 5; 900 INJECTION, SOLUTION INTRAVENOUS at 08:38

## 2023-03-29 RX ADMIN — DIATRIZOATE MEGLUMINE AND DIATRIZOATE SODIUM 90 ML: 660; 100 SOLUTION ORAL; RECTAL at 17:47

## 2023-03-29 RX ADMIN — POTASSIUM CHLORIDE, DEXTROSE MONOHYDRATE AND SODIUM CHLORIDE: 150; 5; 900 INJECTION, SOLUTION INTRAVENOUS at 18:52

## 2023-03-29 ASSESSMENT — ACTIVITIES OF DAILY LIVING (ADL)
ADLS_ACUITY_SCORE: 18

## 2023-03-29 NOTE — PROGRESS NOTES
General Surgery Progress Note    Admission Date: 3/27/2023  Today's Date: 3/29/2023         Assessment:      Aidan Mays is a 42 year old female with multiple prior abdominal surgeries including laparotomy and lysis of adhesions in December 2022 now admitted with recurrent small bowel obstruction.  Feeling much better today, pain and nausea resolved, + flatus. 1.3L out from NG x 24 hours         Plan:   - Continue with NG decompression today given fairly large output yesterday, OK to clamp for walks and for longer periods of time as desired.  - Gastrografin challenge tonight. Plan to give contrast via NG this evening with follow-up XR in the morning  - Continue maintenance IV fluids, pain meds and anti-emetics available if needed. Protonix daily for GI ppx  - Encourage frequent out of bed and ambulation to promote bowel function. Wear PCDs while resting for DVT ppx  - Medical management per hospitalist        Interval History:   Afebrile, vitals stable on room air. Aidan feels much better today. She denies any pain currently. Nausea has completely resolved. She has been up moving well, going for walks, is passing a lot of gas. No BM yet. Taking in some ice chips with NG in place.          Physical Exam:   /85 (BP Location: Left arm)   Pulse 83   Temp 98  F (36.7  C) (Oral)   Resp 17   SpO2 97%   I/O last 3 completed shifts:  In: -   Out: 1300 [Emesis/NG output:1300]  General - This is a well developed, well nourished female in no apparent distress. Alert and oriented x 3  Head - normocephalic, atraumatic  Eyes - no scleral icterus, extraocular movements intact  Respiratory: non-labored breathing   Abdomen - Slightly distended, soft. Very mild tenderness to palpation throughout, improved from yesterday. No focal tenderness. No palpable masses or organomegaly. No rebound or guarding. Surgical scars consistent with history.  Extremities - Moves all extremities. Warm without edema. No calf  tenderness  Neurologic - Nonfocal    LABS:  Recent Labs   Lab Test 03/29/23  0720 03/27/23  0923 07/14/22  0711 07/05/22  0934   WBC 5.8 13.0*  --  5.2   HGB 11.8 16.0* 11.3* 14.9   MCV 90 86  --  92    331 179 208      Recent Labs   Lab Test 03/29/23  0720 03/28/23  0746 03/27/23  0923   POTASSIUM 3.7 3.5 4.2   CHLORIDE 110* 111* 103   CO2 25 24 26   BUN 7.5 12.4 12.7   CR 0.58 0.63 0.62   ANIONGAP 6* 9 13     -------------------------------    Echo Ortiz PA-C  [facility]  [phone]

## 2023-03-29 NOTE — PLAN OF CARE
Goal Outcome Evaluation:         Shift Note:   0700 - 1930    A&Ox4, pleasant mood. VSS on RA. Denied nausea this shift. C/o mild headache, effectively managed with tylenol. Up independently in room. NPO ex ice chips. NG tube clamped @ 1730 after grastrograffin administration. Plan for x-ray @ 0600 on 3/30. R PIV infusing D5+NS+20KCL @ 100ml/hr. Continent of b&b, no BM but is passing flatus. Surgery following.

## 2023-03-29 NOTE — PROGRESS NOTES
[facility]    Medicine Progress Note - Hospitalist Service        Date of Admission:  3/27/2023  9:02 AM    Assessment & Plan:   Aidan Mays is a 42 year old female with PMH of abdominal hysterectomy , h/o SBO (requiring exploratory laparotomy 12/2022) who presented to ED with nausea vomiting and abdominal pain, CT abdomen indicates small bowel obstruction. Admitted inpatient 3/27/23.     Recurrent SBO (small bowel obstruction)   Pelvic adhesions  Hx SBO (requiring exploratory laparotomy and adhesiolysis 12/2022)  -presented with one day of symptoms with nausea vomiting and abdominal pain; noted prior history of hysterectomy and SBO requiring exploratory laparotomy 12/2022  -CT abdomen shows multiple dilated small bowel loops with transition point in the right mid abdomen  -Continue NG decompression, 600 mL out last shift however minimal since this morning  -Continue NG decompression however give a trial with intermittent clamping  -N.p.o. for now with ice chips  -Appreciate general surgery following  -Continue IV fluids   -Patient passing flatus, appears to have some clinical improvement, symptomatic management of nausea and pain     Leukocytosis,reactive  -resolved     Nodule on liver possible hemangioma need mri outpatient  Lesion of right lobe of liver  -CT : 2.4 cm hypoattenuating area in the right hepatic lobe,  indeterminate, could represent hepatic hemangioma versus other hepatic  lesions, recommend further evaluation with contrast-enhanced MRI for  definitive characterization.  -incidentally noted on CT abdomen here, possibly an hemangioma, will need outpatient work-up            Diet: NPO for Medical/Clinical Reasons Except for: Ice Chips     DVT Prophylaxis: Pneumatic Compression Devices   Perez Catheter: Not present  Code Status: Full Code     Disposition Plan      Expected Discharge Date: 03/31/2023              Entered: Yovany Cruz MD 03/29/2023, 7:37 AM        Clinically  "Significant Risk Factors           # Hypercalcemia: Highest Ca = 10.2 mg/dL in last 2 days, will monitor as appropriate                         The patient's care was discussed with the Bedside Nurse and Patient.    Medical Decision Making       **CLEAR ALL SELECTIONS**        Labs/Imaging Reviewed:  See Information above and Data section below    Time SPENT BY ME on the date of service doing chart review, history, exam, documentation & further activities per the note:  35 MINUTES    Yovany Cruz MD  Hospitalist Service  [facility]  Text Page 7AM-6PM  Securely message with the Vocera Web Console (learn more here)  Text page via [employer] Paging/Directory    ______________________________________________________________________    Interval History   Denies abdominal pain.  Minimal output from NG decompression this morning.  No nausea or vomiting.  Passing flatus.  No bowel movements.  Overall feels better today.    Data reviewed today: I reviewed all medications, new labs and imaging results over the last 24 hours. I personally reviewed no images or EKG's today.    Physical Exam   Vital signs:  Temp: 98  F (36.7  C) Temp src: Oral BP: 132/85 Pulse: 83   Resp: 17 SpO2: 97 % O2 Device: None (Room air)        Estimated body mass index is 26.29 kg/m  as calculated from the following:    Height as of 7/13/22: 1.6 m (5' 3\").    Weight as of 7/16/22: 67.3 kg (148 lb 6.4 oz).      Wt Readings from Last 2 Encounters:   07/16/22 67.3 kg (148 lb 6.4 oz)   07/05/22 66.2 kg (146 lb)       Gen: AAOX3, NAD, comfortable  HEENT: NG tube in place  Resp: CTA B/L, normal WOB, no crackles, no wheezes  CVS: RRR, no murmur  Abd/GI: Soft, mild nonspecific tenderness, bowel sounds sluggish  Skin: Warm, dry no rashes  MSK: no pedal edema  Neuro- CN- intact. No focal deficits.        Data   Recent Labs   Lab 03/28/23  0746 03/27/23  0923   WBC  --  13.0*   HGB  --  16.0*   MCV  --  86   PLT  --  331    142 "   POTASSIUM 3.5 4.2   CHLORIDE 111* 103   CO2 24 26   BUN 12.4 12.7   CR 0.63 0.62   ANIONGAP 9 13   CRISPIN 8.7 10.2*   * 146*   ALBUMIN  --  4.6   PROTTOTAL  --  8.4*   BILITOTAL  --  0.4   ALKPHOS  --  97   ALT  --  23   AST  --  33   LIPASE  --  33       Recent Results (from the past 24 hour(s))   XR Abdomen Port 1 View    Narrative    XR ABDOMEN PORT 1 VIEW 3/28/2023 10:02 AM    HISTORY: S/p NG placement, confirm location    COMPARISON: CT 3/27/2023      Impression    IMPRESSION: New NG tube in good position in the stomach. Dilated loops  of small bowel right mid abdomen similar to previous.    ZAHRAA LU MD         SYSTEM ID:  [id number]     Medications     dextrose 5% and 0.9% NaCl with potassium chloride 20 mEq 100 mL/hr at 03/28/23 2216       fluticasone  1 spray Both Nostrils Daily     pantoprazole  40 mg Intravenous Daily with breakfast     sodium chloride (PF)  3 mL Intracatheter Q8H

## 2023-03-29 NOTE — PLAN OF CARE
6640 - 2132    A&Ox4. VSS on RA. Denies pain, N/V. Up SBA. NPO ex ice chips. NG tube to low intermittent suction, with moderate green output. R PIV infusing D5+0.9 NaCl+20 KCl @100 mL/hr. Continent of B/B, no BM overnight. Surgery following. Discharge pending improvement.

## 2023-03-30 ENCOUNTER — APPOINTMENT (OUTPATIENT)
Dept: GENERAL RADIOLOGY | Facility: CLINIC | Age: 43
DRG: 390 | End: 2023-03-30
Attending: PHYSICIAN ASSISTANT
Payer: COMMERCIAL

## 2023-03-30 PROCEDURE — 120N000001 HC R&B MED SURG/OB

## 2023-03-30 PROCEDURE — C9113 INJ PANTOPRAZOLE SODIUM, VIA: HCPCS | Performed by: INTERNAL MEDICINE

## 2023-03-30 PROCEDURE — 250N000011 HC RX IP 250 OP 636: Performed by: INTERNAL MEDICINE

## 2023-03-30 PROCEDURE — 99232 SBSQ HOSP IP/OBS MODERATE 35: CPT | Performed by: INTERNAL MEDICINE

## 2023-03-30 PROCEDURE — 258N000001 HC RX 258: Performed by: HOSPITALIST

## 2023-03-30 PROCEDURE — 74018 RADEX ABDOMEN 1 VIEW: CPT

## 2023-03-30 PROCEDURE — 99232 SBSQ HOSP IP/OBS MODERATE 35: CPT | Mod: FS | Performed by: PHYSICIAN ASSISTANT

## 2023-03-30 RX ADMIN — POTASSIUM CHLORIDE, DEXTROSE MONOHYDRATE AND SODIUM CHLORIDE: 150; 5; 900 INJECTION, SOLUTION INTRAVENOUS at 04:27

## 2023-03-30 RX ADMIN — FLUTICASONE PROPIONATE 1 SPRAY: 50 SPRAY, METERED NASAL at 09:03

## 2023-03-30 RX ADMIN — PANTOPRAZOLE SODIUM 40 MG: 40 INJECTION, POWDER, FOR SOLUTION INTRAVENOUS at 08:58

## 2023-03-30 ASSESSMENT — ACTIVITIES OF DAILY LIVING (ADL)
ADLS_ACUITY_SCORE: 18

## 2023-03-30 NOTE — PROGRESS NOTES
General Surgery Progress Note    Admission Date: 3/27/2023  Today's Date: 3/30/2023         Assessment:      Aidan Mays is a 42 year old female with multiple prior abdominal surgeries including laparotomy and lysis of adhesions in December 2022 now admitted with recurrent small bowel obstruction - resolving.         Plan:   - Clear liquid diet, slowly advance as tolerated. Low fiber diet this evening vs tomorrow morning  - Maintenance IV fluids running - can saline lock once PO intake adequate. Can stop PPI now that taking PO  - Encourage frequent out of bed and ambulation to promote bowel function. Wear PCDs while resting for DVT ppx  - Medical management per hospitalist    Dispo: anticipate home tomorrow if tolerating diet advancement        Interval History:   Afebrile overnight, vitals stable. Aidan had numerous loose BMs. Feels well today, no pain or nausea. Bloating has completely resolved per her report. Her NG tube was removed this morning and she has been tolerating some apple juice. Feels hungry/thirsty.          Physical Exam:   /78 (BP Location: Left arm)   Pulse 76   Temp 97.3  F (36.3  C) (Oral)   Resp 18   SpO2 95%   I/O last 3 completed shifts:  In: 2990 [I.V.:2990]  Out: 160 [Emesis/NG output:160]  General - This is a well developed, well nourished female in no apparent distress. Alert and oriented x 3  Head - normocephalic, atraumatic  Eyes - no scleral icterus, extraocular movements intact  Respiratory: non-labored breathing   Abdomen - Slightly distended, soft. No appreciable tenderness to palpation throughout. No palpable masses or organomegaly. No rebound or guarding.   Extremities - Moves all extremities. Warm without edema. No calf tenderness  Neurologic - Nonfocal    LABS:  Recent Labs   Lab Test 03/29/23  0720 03/27/23  0923 07/14/22  0711 07/05/22  0934   WBC 5.8 13.0*  --  5.2   HGB 11.8 16.0* 11.3* 14.9   MCV 90 86  --  92    331 179 208      Recent Labs   Lab  Test 03/29/23  0720 03/28/23  0746 03/27/23  0923   POTASSIUM 3.7 3.5 4.2   CHLORIDE 110* 111* 103   CO2 25 24 26   BUN 7.5 12.4 12.7   CR 0.58 0.63 0.62   ANIONGAP 6* 9 13      Recent Labs   Lab Test 03/27/23  0923   ALBUMIN 4.6   BILITOTAL 0.4   ALT 23   AST 33   ALKPHOS 97      Recent Labs   Lab Test 03/27/23  0923   LIPASE 33     Recent Labs   Lab Test 03/27/23  1050 07/19/22  2204   PROTEIN 10* 70*       -------------------------------    Echo Ortiz PA-C  [facility]  [phone]

## 2023-03-30 NOTE — PLAN OF CARE
Goal Outcome Evaluation:         Shift Note:   0700 - 1930    A&Ox4, pleasant and cooperative. VSS on RA. NG removed @ 0900 and diet advanced to low fiber. Tolerating oral intake well, denied pain/N/V. BS active, passing gas, one small loose BM this afternoon. Up independently, ambulation encouraged. IVF stopped d/t adequate oral intake. PIV SL. Likely discharge home Friday 3/31.

## 2023-03-30 NOTE — PROGRESS NOTES
General Surgery - Chart Check Note    Patient with numerous BMs overnight. Minimal NG output, tolerated clamping. XR this morning with contrast throughout colon.  - Remove NG tube  - Start clear liquid diet  - We will see patient later this morning. Anticipate discharge home later today vs tomorrow pending continued improvement and tolerance of diet advancement.      GAYE PrasadC  [facility]  [phone]

## 2023-03-30 NOTE — PROGRESS NOTES
[facility]    Medicine Progress Note - Hospitalist Service        Date of Admission:  3/27/2023  9:02 AM    Assessment & Plan:   Aidan Mays is a 42 year old female with PMH of abdominal hysterectomy , h/o SBO (requiring exploratory laparotomy 12/2022) who presented to ED with nausea vomiting and abdominal pain, CT abdomen indicates small bowel obstruction. Admitted inpatient 3/27/23.     Recurrent SBO (small bowel obstruction)   Pelvic adhesions  Hx SBO (requiring exploratory laparotomy and adhesiolysis 12/2022)  -presented with one day of symptoms with nausea vomiting and abdominal pain; noted prior history of hysterectomy and SBO requiring exploratory laparotomy 12/2022  -CT abdomen shows multiple dilated small bowel loops with transition point in the right mid abdomen  -Gastrografin x-ray today shows contrast in the colon  -Patient has had multiple loose bowel movements overnight  -Pain, nausea and vomiting resolved  -Discontinue NG tube today  -Start clear liquid diet and slowly advance, likely home later today or tomorrow depending on clinical course  -Appreciate general surgery following.    Leukocytosis,reactive  -resolved     Nodule on liver possible hemangioma need mri outpatient  Lesion of right lobe of liver  -CT : 2.4 cm hypoattenuating area in the right hepatic lobe,  indeterminate, could represent hepatic hemangioma versus other hepatic  lesions, recommend further evaluation with contrast-enhanced MRI for  definitive characterization.  -incidentally noted on CT abdomen here, possibly an hemangioma, will need outpatient work-up          Diet: Clear Liquid Diet     DVT Prophylaxis: Pneumatic Compression Devices   Perez Catheter: Not present  Code Status: Full Code     Disposition Plan       Expected Discharge Date: 04/01/2023              Entered: Yovany Cruz MD 03/30/2023, 10:00 AM        Clinically Significant Risk Factors                                   The patient's care  "was discussed with the Bedside Nurse and Patient.    Medical Decision Making       **CLEAR ALL SELECTIONS**        Labs/Imaging Reviewed:  See Information above and Data section below    Time SPENT BY ME on the date of service doing chart review, history, exam, documentation & further activities per the note:  35 MINUTES    Yovany Cruz MD  Hospitalist Service  [facility]  Text Page 7AM-6PM  Securely message with the Vocera Web Console (learn more here)  Text page via [employer] Paging/Directory    ______________________________________________________________________    Interval History   Feels overall better.  Had multiple loose bowel movements overnight.  Gastrografin x-ray done today shows contrast in the colon.  No further nausea, vomiting.    Data reviewed today: I reviewed all medications, new labs and imaging results over the last 24 hours. I personally reviewed no images or EKG's today.    Physical Exam   Vital signs:  Temp: 97.3  F (36.3  C) Temp src: Oral BP: 122/78 Pulse: 76   Resp: 18 SpO2: 95 % O2 Device: None (Room air)        Estimated body mass index is 26.29 kg/m  as calculated from the following:    Height as of 7/13/22: 1.6 m (5' 3\").    Weight as of 7/16/22: 67.3 kg (148 lb 6.4 oz).      Wt Readings from Last 2 Encounters:   07/16/22 67.3 kg (148 lb 6.4 oz)   07/05/22 66.2 kg (146 lb)       Gen: AAOX3, NAD, comfortable  Resp: CTA B/L, normal WOB  CVS: RRR, no murmur  Abd/GI: Soft, NT, BS- normoactive  Skin: Warm, dry no rashes  MSK: no pedal edema  Neuro- CN- intact. No focal deficits.        Data   Recent Labs   Lab 03/29/23  0720 03/28/23  0746 03/27/23  0923   WBC 5.8  --  13.0*   HGB 11.8  --  16.0*   MCV 90  --  86     --  331    144 142   POTASSIUM 3.7 3.5 4.2   CHLORIDE 110* 111* 103   CO2 25 24 26   BUN 7.5 12.4 12.7   CR 0.58 0.63 0.62   ANIONGAP 6* 9 13   CRISPIN 8.7 8.7 10.2*   * 150* 146*   ALBUMIN  --   --  4.6   PROTTOTAL  --   --  8.4* "   BILITOTAL  --   --  0.4   ALKPHOS  --   --  97   ALT  --   --  23   AST  --   --  33   LIPASE  --   --  33       Recent Results (from the past 24 hour(s))   XR Abdomen 1 View    Narrative    ABDOMEN ONE VIEW  3/30/2023 6:55 AM    HISTORY:  42-year-old woman with history of small bowel obstruction.      Impression    IMPRESSION: Since March 28, 2023, there remains no intraperitoneal  air. Esophagogastric tube is noted with tip in the distal gastric  body. Contrast noted mostly in the colon. Loops of small bowel are  air-filled, though none of which are abnormally dilated.    BRAULIO MOREL MD         SYSTEM ID:  [id number]     Medications     dextrose 5% and 0.9% NaCl with potassium chloride 20 mEq 100 mL/hr at 03/30/23 0427       fluticasone  1 spray Both Nostrils Daily     pantoprazole  40 mg Intravenous Daily with breakfast     sodium chloride (PF)  3 mL Intracatheter Q8H

## 2023-03-31 VITALS
HEART RATE: 60 BPM | SYSTOLIC BLOOD PRESSURE: 122 MMHG | OXYGEN SATURATION: 96 % | DIASTOLIC BLOOD PRESSURE: 84 MMHG | RESPIRATION RATE: 18 BRPM | TEMPERATURE: 98.2 F

## 2023-03-31 PROCEDURE — 99239 HOSP IP/OBS DSCHRG MGMT >30: CPT | Performed by: HOSPITALIST

## 2023-03-31 PROCEDURE — 250N000013 HC RX MED GY IP 250 OP 250 PS 637: Performed by: PHYSICIAN ASSISTANT

## 2023-03-31 PROCEDURE — 99232 SBSQ HOSP IP/OBS MODERATE 35: CPT | Performed by: PHYSICIAN ASSISTANT

## 2023-03-31 RX ORDER — POLYETHYLENE GLYCOL 3350 17 G/17G
17 POWDER, FOR SOLUTION ORAL DAILY
Status: DISCONTINUED | OUTPATIENT
Start: 2023-03-31 | End: 2023-03-31 | Stop reason: HOSPADM

## 2023-03-31 RX ORDER — METHYLCELLULOSE 500 MG/1
500 TABLET ORAL DAILY PRN
Qty: 60 TABLET | Refills: 1 | Status: SHIPPED | OUTPATIENT
Start: 2023-03-31

## 2023-03-31 RX ORDER — POLYETHYLENE GLYCOL 3350 17 G/17G
1 POWDER, FOR SOLUTION ORAL 2 TIMES DAILY PRN
Qty: 578 G | Refills: 1 | Status: SHIPPED | OUTPATIENT
Start: 2023-03-31

## 2023-03-31 RX ADMIN — POLYETHYLENE GLYCOL 3350 17 G: 17 POWDER, FOR SOLUTION ORAL at 08:57

## 2023-03-31 ASSESSMENT — ACTIVITIES OF DAILY LIVING (ADL)
ADLS_ACUITY_SCORE: 18

## 2023-03-31 NOTE — PLAN OF CARE
"Goal Outcome Evaluation:    Pt is AO x4, pleasant and cooperative. VSS on RA. Denies pain and nausea, reports feeling overall \"back to normal.\" Up ind in room to BR, passing flatus, no loose BMs. Tolerating low fiber diet well, BS active. L PIV SL. Discharge likely today.      "

## 2023-03-31 NOTE — PLAN OF CARE
Goal Outcome Evaluation:  Provided patient with discharge instructions and take home medication.  here to drive her home.

## 2023-03-31 NOTE — PROGRESS NOTES
General Surgery Progress Note    Admission Date: 3/27/2023  Today's Date: 3/31/2023         Assessment:      Aidan Mays is a 42 year old female with multiple prior abdominal surgeries including laparotomy and lysis of adhesions in December 2022 now admitted with recurrent small bowel obstruction - resolving.         Plan:   - Continue low fiber diet for 1 week, then can gradually advance as tolerated if doing well  - Recommend increasing bowel regimen at baseline. Discussed options. Patient will continue her home colace and will add miralax and citrucel to use as she needs  - IV fluids saline locked. PPI discontinued now that tolerating PO  - Continue with frequent out of bed/ambulation, patient is up and moving well. PCDs while resting  - Medical management per hospitalist    Dispo: OK for discharge home today        Interval History:   Afebrile, vitals stable on room air. Aidan is feeling well today, back to her normal self. No abdominal pain or nausea, tolerating low fiber diet but she is being cautious and going slowly. She had another small loose/watery BM after lunch yesterday. Continues to pass gas. Feels her bowels have emptied out. Eager to go home today.          Physical Exam:   /84 (BP Location: Right arm)   Pulse 60   Temp 98.2  F (36.8  C) (Oral)   Resp 18   SpO2 96%   I/O last 3 completed shifts:  In: 1080 [P.O.:1080]  Out: -   General - This is a well developed, well nourished female in no apparent distress. Alert and oriented x 3  Head - normocephalic, atraumatic  Eyes - no scleral icterus, extraocular movements intact  Respiratory: non-labored breathing   Abdomen - Soft, no significant distention. Nontender to palpation throughout. No palpable masses or organomegaly. No rebound or guarding. Surgical scars consistent with history.  Neurologic - Nonfocal       LABS:  Recent Labs   Lab Test 03/29/23  0720 03/27/23  0923 07/14/22  0711 07/05/22  0934   WBC 5.8 13.0*  --  5.2   HGB 11.8  16.0* 11.3* 14.9   MCV 90 86  --  92    331 179 208      Recent Labs   Lab Test 03/29/23  0720 03/28/23  0746 03/27/23  0923   POTASSIUM 3.7 3.5 4.2   CHLORIDE 110* 111* 103   CO2 25 24 26   BUN 7.5 12.4 12.7   CR 0.58 0.63 0.62   ANIONGAP 6* 9 13     -------------------------------    Echo Ortiz PA-C  [facility]  [phone]

## 2023-03-31 NOTE — DISCHARGE SUMMARY
Discharge Summary  Hospitalist    Date of Admission:  3/27/2023  Date of Discharge:  3/31/2023  Discharging Provider: Syeda Jamison MD, MD  Date of Service (when I saw the patient): 03/31/23    Discharge Diagnoses   Recurrent SBO (small bowel obstruction)   Pelvic adhesions  Hx SBO    History of Present Illness   Please refer H & P for details.      Hospital Course   Aidan Mays is a 42 year old female with PMH of abdominal hysterectomy , h/o SBO (requiring exploratory laparotomy 12/2022) who presented to ED with nausea vomiting and abdominal pain, CT abdomen indicates small bowel obstruction. Admitted inpatient 3/27/23.     Recurrent SBO (small bowel obstruction)   Pelvic adhesions  Hx SBO (requiring exploratory laparotomy and adhesiolysis 12/2022)  -presented with one day of symptoms with nausea vomiting and abdominal pain; noted prior history of hysterectomy and SBO requiring exploratory laparotomy 12/2022  -CT abdomen shows multiple dilated small bowel loops with transition point in the right mid abdomen  -Gastrografin x-ray  shows contrast in the colon  -Patient has had multiple loose bowel movements after Gastrografin challenge  -Pain, nausea and vomiting resolved  -Discontinued NG tube  -Advance diet slowly and patient tolerated this well.  Patient cleared for discharge home per surgery.  She is prescribed Citrucel and MiraLAX.     Leukocytosis,reactive  -resolved     Nodule on liver possible hemangioma need mri outpatient  Lesion of right lobe of liver  -CT : 2.4 cm hypoattenuating area in the right hepatic lobe,  indeterminate, could represent hepatic hemangioma versus other hepatic  lesions, recommend further evaluation with contrast-enhanced MRI for  definitive characterization.  -incidentally noted on CT abdomen here, possibly an hemangioma, will need outpatient work-up         Syeda Jamison MD, MD      Pending Results   These results will be followed up by Hospitalist team.  Unresulted Labs  Ordered in the Past 30 Days of this Admission     No orders found from 2/25/2023 to 3/28/2023.          Code Status   Full Code       Primary Care Physician   [facility]    Follow-ups Needed After Discharge   Follow-up Appointments     Follow-up and recommended labs and tests       You do not need to follow-up with anyone regarding this hospitalization   unless you have continued difficulty with your abdomen or bowel movements.   Follow-up with your PCP for routine health maintenance as scheduled.   You were seen by the surgical team from [facility]. If you have   continued issues and would like to be seen by one of our surgeons, our   address is [address], [address], [place], [address]. Our phone   number is [phone]             Physical Exam   Temp: 98.2  F (36.8  C) Temp src: Oral BP: 122/84 Pulse: 60   Resp: 18 SpO2: 96 % O2 Device: None (Room air)    There were no vitals filed for this visit.  Vital Signs with Ranges  Temp:  [98.2  F (36.8  C)-98.9  F (37.2  C)] 98.2  F (36.8  C)  Pulse:  [60-78] 60  Resp:  [16-18] 18  BP: (122-138)/(84-96) 122/84  SpO2:  [96 %-97 %] 96 %  I/O last 3 completed shifts:  In: 240 [P.O.:240]  Out: -     Constitutional: Alert, cooperative, no apparent distress  Respiratory: Non labored breathing, clear to auscultation bilaterally, no crackles or wheezing  Cardiovascular: Regular rate and rhythm, no murmurs, no edema  GI: Normal bowel sounds, soft, non-distended, non-tender  Skin: No obvious rash  Neuro: Alert, engages in appropriate conversation, fluent speech, moving all extremities, no facial asymmetry  Psych: Calm and pleasant, no obvious anxiety/ depression      Discharge Disposition   Discharged to home  Condition at discharge: Stable    Consultations This Hospital Stay   SURGERY GENERAL IP CONSULT    Time Spent on this Encounter   Syeda RICHARD MD, personally saw the patient today and spent greater than 30 minutes discharging this  patient.    Discharge Orders      Reason for your hospital stay    Small bowel obstruction - resolved     Follow-up and recommended labs and tests     You do not need to follow-up with anyone regarding this hospitalization unless you have continued difficulty with your abdomen or bowel movements. Follow-up with your PCP for routine health maintenance as scheduled.   You were seen by the surgical team from [facility]. If you have continued issues and would like to be seen by one of our surgeons, our address is [address] [place] [address] [address], [place], [place], [address]. Our phone number is [phone]     Activity    Regular activity as tolerated. No activity restrictions     Diet    Follow a low fiber diet for 1 week, then you can gradually resume your regular diet as tolerated. Use the bowel regimen (Colace, citrucel, miralax) as needed to maintain regular, soft bowel movements.     Discharge Medications   Discharge Medication List as of 3/31/2023 11:15 AM      START taking these medications    Details   methylcellulose (CITRUCEL) 500 MG TABS tablet Take 1 tablet (500 mg) by mouth daily as needed, Disp-60 tablet, R-1, E-Prescribe      polyethylene glycol (MIRALAX) 17 GM/Dose powder Take 17 g (1 capful.) by mouth 2 times daily as needed for constipation, Disp-578 g, R-1, E-Prescribe         CONTINUE these medications which have NOT CHANGED    Details   docusate sodium (COLACE) 100 MG capsule Take 1 capsule (100 mg) by mouth 2 times daily as needed for constipation, Historical      estradiol (ESTRACE) 1 MG tablet Take 1 tablet (1 mg) by mouth daily, Disp-30 tablet, R-12, E-Prescribe           Allergies   No Known Allergies  Data   Most Recent 3 CBC's:  Recent Labs   Lab Test 03/29/23  0720 03/27/23  0923 07/14/22  0711 07/05/22  0934   WBC 5.8 13.0*  --  5.2   HGB 11.8 16.0* 11.3* 14.9   MCV 90 86  --  92    331 179 208      Most Recent 3 BMP's:  Recent Labs   Lab Test 03/29/23  0720 03/28/23  0746  03/27/23  0923    144 142   POTASSIUM 3.7 3.5 4.2   CHLORIDE 110* 111* 103   CO2 25 24 26   BUN 7.5 12.4 12.7   CR 0.58 0.63 0.62   ANIONGAP 6* 9 13   CRISPIN 8.7 8.7 10.2*   * 150* 146*     Most Recent 2 LFT's:  Recent Labs   Lab Test 03/27/23  0923   AST 33   ALT 23   ALKPHOS 97   BILITOTAL 0.4     Most Recent INR's and Anticoagulation Dosing History:  Anticoagulation Dose History     Recent Dosing and Labs Latest Ref Rng & Units 12/19/2010    INR 0.86 - 1.14 0.90        Most Recent 3 Troponin's:No lab results found.  Most Recent Cholesterol Panel:No lab results found.  Most Recent 6 Bacteria Isolates From Any Culture (See EPIC Reports for Culture Details):No lab results found.  Most Recent TSH, T4 and A1c Labs:No lab results found.    Results for orders placed or performed during the hospital encounter of 03/27/23   CT Abdomen Pelvis w Contrast    Narrative    CT ABDOMEN/PELVIS WITH CONTRAST March 27, 2023 10:21 AM    HISTORY: Bilateral lower quadrant pain and tenderness.    TECHNIQUE: CT scan obtained of the abdomen, and pelvis with IV  contrast. 74mL Isovue-370 IV injected. Radiation dose for this scan  was reduced using automated exposure control, adjustment of the mA  and/or kV according to patient size, or iterative reconstruction  technique.    COMPARISON: None available.    FINDINGS:    Lower chest: Mild basilar pulmonary opacities, likely atelectasis.    Abdomen/pelvis:    Hepatobiliary: 2.4 cm hypoattenuating area in hepatic segment 7  (series 4 image 49), indeterminate, could represent hepatic  hemangioma. The gallbladder is unremarkable.    Pancreas: No main pancreatic ductal dilatation or definite solid  pancreatic mass.    Spleen: No splenomegaly.    Adrenal glands: No adrenal nodules.    Kidneys: No radiodense kidney/ureteral stones or hydronephrosis in the  kidney.    Bowel: Multiple dilated small bowel loops with transition point in the  right midabdomen (series 4 image 98). The appendix  is visualized and  appears normal.    Peritoneum: Small amount of free fluid in the pelvis, indeterminate,  could be reactive.    Pelvic organs: Unremarkable.    Vascular: Unremarkable.    Lymph nodes: No significant abdominopelvic lymphadenopathy.    Bones and soft tissue: No suspicious osseous lesion.      Impression    IMPRESSION:   1. Multiple dilated small bowel loops, with the transition point in  the right mid abdomen, findings worrisome for small bowel obstruction.  2. Small amount of free fluid in the pelvis, indeterminate, could be  reactive.  3. 2.4 cm hypoattenuating area in the right hepatic lobe,  indeterminate, could represent hepatic hemangioma versus other hepatic  lesions, recommend further evaluation with contrast-enhanced MRI for  definitive characterization.    ADITYA URIARTE MD         SYSTEM ID:  [id number]   XR Abdomen Port 1 View    Narrative    XR ABDOMEN PORT 1 VIEW 3/28/2023 10:02 AM    HISTORY: S/p NG placement, confirm location    COMPARISON: CT 3/27/2023      Impression    IMPRESSION: New NG tube in good position in the stomach. Dilated loops  of small bowel right mid abdomen similar to previous.    ZAHRAA LU MD         SYSTEM ID:  [id number]   XR Abdomen 1 View    Narrative    ABDOMEN ONE VIEW  3/30/2023 6:55 AM    HISTORY:  42-year-old woman with history of small bowel obstruction.      Impression    IMPRESSION: Since March 28, 2023, there remains no intraperitoneal  air. Esophagogastric tube is noted with tip in the distal gastric  body. Contrast noted mostly in the colon. Loops of small bowel are  air-filled, though none of which are abnormally dilated.    BRAULIO MOREL MD         SYSTEM ID:  [id number]

## 2023-05-08 ENCOUNTER — PATIENT OUTREACH (OUTPATIENT)
Dept: CARE COORDINATION | Facility: CLINIC | Age: 43
End: 2023-05-08
Payer: COMMERCIAL

## 2023-05-22 ENCOUNTER — PATIENT OUTREACH (OUTPATIENT)
Dept: CARE COORDINATION | Facility: CLINIC | Age: 43
End: 2023-05-22
Payer: COMMERCIAL

## 2023-07-15 ENCOUNTER — HEALTH MAINTENANCE LETTER (OUTPATIENT)
Age: 43
End: 2023-07-15

## 2023-08-07 ENCOUNTER — OFFICE VISIT (OUTPATIENT)
Dept: FAMILY MEDICINE | Facility: CLINIC | Age: 43
End: 2023-08-07
Payer: COMMERCIAL

## 2023-08-07 VITALS
RESPIRATION RATE: 16 BRPM | BODY MASS INDEX: 25.73 KG/M2 | SYSTOLIC BLOOD PRESSURE: 110 MMHG | HEIGHT: 63 IN | DIASTOLIC BLOOD PRESSURE: 62 MMHG | TEMPERATURE: 97.5 F | WEIGHT: 145.2 LBS | OXYGEN SATURATION: 99 % | HEART RATE: 84 BPM

## 2023-08-07 DIAGNOSIS — K76.9 LIVER LESION: ICD-10-CM

## 2023-08-07 DIAGNOSIS — Z00.00 ANNUAL PHYSICAL EXAM: Primary | ICD-10-CM

## 2023-08-07 DIAGNOSIS — N63.21 MASS OF UPPER OUTER QUADRANT OF LEFT BREAST: ICD-10-CM

## 2023-08-07 PROCEDURE — 99396 PREV VISIT EST AGE 40-64: CPT | Performed by: FAMILY MEDICINE

## 2023-08-07 PROCEDURE — 99214 OFFICE O/P EST MOD 30 MIN: CPT | Mod: 25 | Performed by: FAMILY MEDICINE

## 2023-08-07 ASSESSMENT — ENCOUNTER SYMPTOMS
NAUSEA: 0
ARTHRALGIAS: 0
SHORTNESS OF BREATH: 0
EYE PAIN: 0
CHILLS: 0
WEAKNESS: 0
MYALGIAS: 0
DIARRHEA: 0
PALPITATIONS: 0
JOINT SWELLING: 0
SORE THROAT: 0
CONSTIPATION: 0
ABDOMINAL PAIN: 0
DIZZINESS: 0
HEMATURIA: 0
DYSURIA: 0
HEMATOCHEZIA: 0
PARESTHESIAS: 0
NERVOUS/ANXIOUS: 0
FREQUENCY: 0
HEARTBURN: 0
COUGH: 0
BREAST MASS: 0
FEVER: 0
HEADACHES: 0

## 2023-08-07 ASSESSMENT — PAIN SCALES - GENERAL: PAINLEVEL: NO PAIN (0)

## 2023-08-07 NOTE — PROGRESS NOTES
SUBJECTIVE:   CC: Aidan is an 42 year old who presents for preventive health visit.       Healthy Habits:     Getting at least 3 servings of Calcium per day:  Yes    Bi-annual eye exam:  Yes    Dental care twice a year:  Yes    Sleep apnea or symptoms of sleep apnea:  None    Diet:  Vegetarian/vegan    Frequency of exercise:  None    Additional concerns today:  No      Today's PHQ-2 Score:       8/7/2023    12:14 PM   PHQ-2 ( 1999 [employer])   Q1: Little interest or pleasure in doing things 0   Q2: Feeling down, depressed or hopeless 0   PHQ-2 Score 0   Q1: Little interest or pleasure in doing things Not at all   Q2: Feeling down, depressed or hopeless Not at all   PHQ-2 Score 0                   Have you ever done Advance Care Planning? (For example, a Health Directive, POLST, or a discussion with a medical provider or your loved ones about your wishes): No, advance care planning information given to patient to review.  Patient declined advance care planning discussion at this time.    Social History     Tobacco Use    Smoking status: Never    Smokeless tobacco: Never   Substance Use Topics    Alcohol use: No     Alcohol/week: 0.0 standard drinks of alcohol             8/7/2023    12:14 PM   Alcohol Use   Prescreen: >3 drinks/day or >7 drinks/week? Not Applicable     Reviewed orders with patient.  Reviewed health maintenance and updated orders accordingly - Yes  Patient Active Problem List   Diagnosis    CARDIOVASCULAR SCREENING; LDL GOAL LESS THAN 160    Thyroid nodule    Endometriosis    S/P laparotomy    Fibroids    Pelvic adhesions    Surgery follow-up    SBO (small bowel obstruction) (H)    Hx SBO    Nodule on liver possible hemangioma need mri outpatient    Small bowel obstruction (H)    Lesion of right lobe of liver    Leukocytosis, unspecified type    Nausea and vomiting, unspecified vomiting type     Past Surgical History:   Procedure Laterality Date    C/SECTION, LOW TRANSVERSE      twins    COMBINED  CYSTOSCOPY, INSERT CATHETER URETER Bilateral 07/13/2022    Procedure: CYSTOSCOPY,PLACING BILATERAL  URETERAL CATHETERS;  Surgeon: Edouard Cummins MD;  Location:  OR    FINE NEEDLE ASPIRATION WITHOUT IMAGING GUIDANCE  02/21/2011    left thyroid nodule    FRACTURE SURGERY Left     leg    HYSTERECTOMY TOTAL ABDOMINAL, BILATERAL SALPINGO-OOPHORECTOMY, COMBINED Right 07/13/2022    Procedure: exploratory laparotomy ; total abdominal hysterectomy ; right salpingo oophorectomy, SUTURE REPAIR OF THE BLADDER, and removal of ureteral catheters, lysis of adhesions, bilateral ureterolysis;  Surgeon: Jennifer Sanchez MD;  Location:  OR    HYSTERECTOMY, PAP NO LONGER INDICATED      LAPAROTOMY EXPLORATORY N/A 01/06/2022    Procedure: Laparotomy exploratory;  Surgeon: Scotty Garcia MD;  Location:  OR       Social History     Tobacco Use    Smoking status: Never    Smokeless tobacco: Never   Substance Use Topics    Alcohol use: No     Alcohol/week: 0.0 standard drinks of alcohol     Family History   Problem Relation Age of Onset    Fibroids Mother     Hypertension Mother     Breast Cancer Maternal Aunt 62         Current Outpatient Medications   Medication Sig Dispense Refill    docusate sodium (COLACE) 100 MG capsule Take 1 capsule (100 mg) by mouth 2 times daily as needed for constipation      estradiol (ESTRACE) 1 MG tablet Take 1 tablet (1 mg) by mouth daily 30 tablet 12    methylcellulose (CITRUCEL) 500 MG TABS tablet Take 1 tablet (500 mg) by mouth daily as needed 60 tablet 1    polyethylene glycol (MIRALAX) 17 GM/Dose powder Take 17 g (1 capful.) by mouth 2 times daily as needed for constipation 578 g 1     No Known Allergies    Breast Cancer Screening:    FHS-7:       7/21/2021     8:53 AM 6/2/2022    10:36 AM 7/4/2022     8:47 PM 9/20/2022     8:07 AM 8/8/2023    10:02 AM   Breast CA Risk Assessment (FHS-7)   Did any of your first-degree relatives have breast or ovarian cancer? Yes Yes Yes No No   Did any  of your relatives have bilateral breast cancer? No Unknown No No No   Did any man in your family have breast cancer? No No No No No   Did any woman in your family have breast and ovarian cancer? Yes Yes No No No   Did any woman in your family have breast cancer before age 50 y? No No No No No   Do you have 2 or more relatives with breast and/or ovarian cancer? No No No No No   Do you have 2 or more relatives with breast and/or bowel cancer? No No No No No       Mammogram Screening - Offered annual screening and updated Health Maintenance for [place] plan based on risk factor consideration  Pertinent mammograms are reviewed under the imaging tab.    History of abnormal Pap smear: Status post benign hysterectomy. Health Maintenance and Surgical History updated.     Reviewed and updated as needed this visit by clinical staff   Tobacco  Allergies  Meds              Reviewed and updated as needed this visit by Provider    Allergies                  Review of Systems   Constitutional:  Negative for chills and fever.   HENT:  Negative for congestion, ear pain, hearing loss and sore throat.    Eyes:  Negative for pain and visual disturbance.   Respiratory:  Negative for cough and shortness of breath.    Cardiovascular:  Negative for chest pain, palpitations and peripheral edema.   Gastrointestinal:  Negative for abdominal pain, constipation, diarrhea, heartburn, hematochezia and nausea.   Breasts:  Negative for tenderness, breast mass and discharge.   Genitourinary:  Negative for dysuria, frequency, genital sores, hematuria, pelvic pain, urgency, vaginal bleeding and vaginal discharge.   Musculoskeletal:  Negative for arthralgias, joint swelling and myalgias.   Skin:  Negative for rash.   Neurological:  Negative for dizziness, weakness, headaches and paresthesias.   Psychiatric/Behavioral:  Negative for mood changes. The patient is not nervous/anxious.           OBJECTIVE:   /62 (BP Location: Left arm, Patient  "Position: Sitting, Cuff Size: Adult Regular)   Pulse 84   Temp 97.5  F (36.4  C) (Tympanic)   Resp 16   Ht 1.61 m (5' 3.39\")   Wt 65.9 kg (145 lb 3.2 oz)   SpO2 99%   BMI 25.41 kg/m    Physical Exam  GENERAL: healthy, alert and no distress  EYES: Eyes grossly normal to inspection, PERRL and conjunctivae and sclerae normal  HENT: ear canals and TM's normal, nose and mouth without ulcers or lesions  NECK: no adenopathy, no asymmetry, masses, or scars and thyroid normal to palpation  RESP: lungs clear to auscultation - no rales, rhonchi or wheezes  BREAST: Left upper outer quadrant lump noted. No nipple discharge and no palpable axillary masses or adenopathy  CV: regular rate and rhythm, normal S1 S2, no S3 or S4, no murmur, click or rub, no peripheral edema and peripheral pulses strong  ABDOMEN: soft, nontender, no hepatosplenomegaly, no masses and bowel sounds normal  MS: no gross musculoskeletal defects noted, no edema  SKIN: no suspicious lesions or rashes  NEURO: Normal strength and tone, mentation intact and speech normal  PSYCH: mentation appears normal, affect normal/bright        ASSESSMENT/PLAN:   1. Annual physical exam  Fasting labs ordered  - Lipid panel reflex to direct LDL Fasting; Future  - Comprehensive metabolic panel; Future  - Hemoglobin; Future  - TSH with free T4 reflex; Future    2. Liver lesion  Liver lesion noted in 2023 on imaging study. Recommended follow up on that for comparison.   - MR Liver wo & w Contrast; Future    3. Mass of upper outer quadrant of left breast  Recommended work up for breast lump.  - MA Diagnostic Digital Bilateral; Future            COUNSELING:  Reviewed preventive health counseling, as reflected in patient instructions       Regular exercise       Healthy diet/nutrition        She reports that she has never smoked. She has never used smokeless tobacco.          Sloane Castro MD  [facility]  "

## 2023-08-08 ENCOUNTER — HOSPITAL ENCOUNTER (OUTPATIENT)
Dept: ULTRASOUND IMAGING | Facility: CLINIC | Age: 43
Discharge: HOME | End: 2023-08-08
Attending: FAMILY MEDICINE
Payer: COMMERCIAL

## 2023-08-08 ENCOUNTER — LAB (OUTPATIENT)
Dept: LAB | Facility: CLINIC | Age: 43
End: 2023-08-08
Payer: COMMERCIAL

## 2023-08-08 ENCOUNTER — HOSPITAL ENCOUNTER (OUTPATIENT)
Dept: MAMMOGRAPHY | Facility: CLINIC | Age: 43
Discharge: HOME | End: 2023-08-08
Attending: FAMILY MEDICINE
Payer: COMMERCIAL

## 2023-08-08 ENCOUNTER — ANCILLARY ORDERS (OUTPATIENT)
Dept: FAMILY MEDICINE | Facility: CLINIC | Age: 43
End: 2023-08-08

## 2023-08-08 DIAGNOSIS — K76.9 LIVER LESION: ICD-10-CM

## 2023-08-08 DIAGNOSIS — Z00.00 ANNUAL PHYSICAL EXAM: ICD-10-CM

## 2023-08-08 DIAGNOSIS — N63.21 MASS OF UPPER OUTER QUADRANT OF LEFT BREAST: ICD-10-CM

## 2023-08-08 DIAGNOSIS — N63.21 MASS OF UPPER OUTER QUADRANT OF LEFT BREAST: Primary | ICD-10-CM

## 2023-08-08 DIAGNOSIS — Z00.00 ANNUAL PHYSICAL EXAM: Primary | ICD-10-CM

## 2023-08-08 LAB
ALBUMIN SERPL BCG-MCNC: 4.4 G/DL (ref 3.5–5.2)
ALP SERPL-CCNC: 69 U/L (ref 35–104)
ALT SERPL W P-5'-P-CCNC: 11 U/L (ref 0–50)
ANION GAP SERPL CALCULATED.3IONS-SCNC: 11 MMOL/L (ref 7–15)
AST SERPL W P-5'-P-CCNC: 27 U/L (ref 0–45)
BILIRUB SERPL-MCNC: 0.6 MG/DL
BUN SERPL-MCNC: 8.5 MG/DL (ref 6–20)
CALCIUM SERPL-MCNC: 9.6 MG/DL (ref 8.6–10)
CHLORIDE SERPL-SCNC: 105 MMOL/L (ref 98–107)
CHOLEST SERPL-MCNC: 169 MG/DL
CREAT SERPL-MCNC: 0.74 MG/DL (ref 0.51–0.95)
DEPRECATED HCO3 PLAS-SCNC: 25 MMOL/L (ref 22–29)
GFR SERPL CREATININE-BSD FRML MDRD: >90 ML/MIN/1.73M2
GLUCOSE SERPL-MCNC: 90 MG/DL (ref 70–99)
HDLC SERPL-MCNC: 62 MG/DL
HGB BLD-MCNC: 14.5 G/DL (ref 11.7–15.7)
LDLC SERPL CALC-MCNC: 93 MG/DL
NONHDLC SERPL-MCNC: 107 MG/DL
POTASSIUM SERPL-SCNC: 3.8 MMOL/L (ref 3.4–5.3)
PROT SERPL-MCNC: 7.2 G/DL (ref 6.4–8.3)
SODIUM SERPL-SCNC: 141 MMOL/L (ref 136–145)
TRIGL SERPL-MCNC: 70 MG/DL
TSH SERPL DL<=0.005 MIU/L-ACNC: 3.76 UIU/ML (ref 0.3–4.2)

## 2023-08-08 PROCEDURE — 76642 ULTRASOUND BREAST LIMITED: CPT | Mod: LT

## 2023-08-08 PROCEDURE — 77066 DX MAMMO INCL CAD BI: CPT

## 2023-08-08 PROCEDURE — 80061 LIPID PANEL: CPT

## 2023-08-08 PROCEDURE — 80053 COMPREHEN METABOLIC PANEL: CPT

## 2023-08-08 PROCEDURE — 84443 ASSAY THYROID STIM HORMONE: CPT

## 2023-08-08 PROCEDURE — 36415 COLL VENOUS BLD VENIPUNCTURE: CPT

## 2023-08-08 PROCEDURE — 85018 HEMOGLOBIN: CPT

## 2023-08-11 ENCOUNTER — HOSPITAL ENCOUNTER (OUTPATIENT)
Dept: MRI IMAGING | Facility: CLINIC | Age: 43
Discharge: HOME | End: 2023-08-11
Attending: FAMILY MEDICINE | Admitting: FAMILY MEDICINE
Payer: COMMERCIAL

## 2023-08-11 DIAGNOSIS — K76.9 LIVER LESION: ICD-10-CM

## 2023-08-11 PROCEDURE — 74183 MRI ABD W/O CNTR FLWD CNTR: CPT

## 2023-08-11 PROCEDURE — 255N000002 HC RX 255 OP 636: Performed by: FAMILY MEDICINE

## 2023-08-11 PROCEDURE — A9585 GADOBUTROL INJECTION: HCPCS | Performed by: FAMILY MEDICINE

## 2023-08-11 RX ORDER — GADOBUTROL 604.72 MG/ML
10 INJECTION INTRAVENOUS ONCE
Status: COMPLETED | OUTPATIENT
Start: 2023-08-11 | End: 2023-08-11

## 2023-08-11 RX ADMIN — GADOBUTROL 10 ML: 604.72 INJECTION INTRAVENOUS at 18:35

## 2024-09-07 ENCOUNTER — HEALTH MAINTENANCE LETTER (OUTPATIENT)
Age: 44
End: 2024-09-07

## 2024-10-08 ENCOUNTER — ANCILLARY PROCEDURE (OUTPATIENT)
Dept: MAMMOGRAPHY | Facility: CLINIC | Age: 44
End: 2024-10-08
Attending: FAMILY MEDICINE
Payer: COMMERCIAL

## 2024-10-08 DIAGNOSIS — Z12.31 VISIT FOR SCREENING MAMMOGRAM: ICD-10-CM

## 2024-10-08 PROCEDURE — 77067 SCR MAMMO BI INCL CAD: CPT | Mod: TC | Performed by: RADIOLOGY

## 2024-10-08 PROCEDURE — 77063 BREAST TOMOSYNTHESIS BI: CPT | Mod: TC | Performed by: RADIOLOGY

## 2024-11-27 NOTE — PROVIDER NOTIFICATION
MD Notification    Notified Person: MD    Notified Person Name: Erika    Notification Date/Time: 03/27/23 7:56 PM    Notification Interaction: [employer]    Purpose of Notification: Pt has a stuffy nose. Can we get a decongestant? Thanks!    Orders Received: Flonase ordered.    Comments:   No indicators present

## 2025-02-02 ENCOUNTER — HEALTH MAINTENANCE LETTER (OUTPATIENT)
Age: 45
End: 2025-02-02

## 2025-02-16 ENCOUNTER — E-VISIT (OUTPATIENT)
Dept: URGENT CARE | Facility: CLINIC | Age: 45
End: 2025-02-16
Payer: COMMERCIAL

## 2025-02-16 DIAGNOSIS — J06.9 ACUTE UPPER RESPIRATORY INFECTION, UNSPECIFIED: Primary | ICD-10-CM

## 2025-02-16 NOTE — PATIENT INSTRUCTIONS
Dear Aidan,    After reviewing your responses, I would like you to come in for a swab to make sure we treat you correctly. This swab is to evaluate you for possible COVID and Flu, and should be scheduled for today or tomorrow. Please use the Schedule Now button in [employer] to schedule your swab. Otherwise, click this link to schedule a lab only appointment.    Lab appointments are not available at most locations on the weekends. If no Lab Only appointment is available, you should be seen in any of our convenient Urgent Care Centers for an in person visit, which can be found on our website here.    You will receive instructions with your results in [employer] once they are available.     Currently your symptoms are consistent with a viral infection, which antibiotics don't help.  We'll test you for influenza and covid, and if both of those are negative, you most likely have a different virus causing your symptoms.    If your symptoms worsen, you develop difficulty breathing, difficulty with drinking enough to stay hydrated, or fevers for more than 5 days, please contact your primary care provider for an appointment or visit an Urgent Care Center to be seen.      Thanks again for choosing us as your health care partner.   Xenia Blair MD  Viral Respiratory Infection: Care Instructions  Overview     A viral respiratory infection is an infection of the nose, sinuses, or throat caused by a virus. Colds and the flu are common types of viral respiratory infections.  The symptoms of a viral respiratory infection often start quickly. They include a fever, sore throat, and runny nose. You may also just not feel well. Or you may not want to eat much.  Most viral infections can be treated with home care. This may include drinking lots of fluids and taking over-the-counter pain medicine. You will probably feel better in 4 to 10 days.  Antibiotics are not used to treat a viral infection. Antibiotics don't kill viruses, so they  won't help cure a viral illness.  In some cases, a doctor may prescribe antiviral medicine to help your body fight a serious viral infection.  Follow-up care is a key part of your treatment and safety. Be sure to make and go to all appointments, and call your doctor if you are having problems. It's also a good idea to know your test results and keep a list of the medicines you take.  How can you care for yourself at home?  To prevent dehydration, drink plenty of fluids. Choose water and other clear liquids until you feel better. If you have kidney, heart, or liver disease and have to limit fluids, talk with your doctor before you increase the amount of fluids you drink.  Ask your doctor if you can take an over-the-counter pain medicine, such as acetaminophen (Tylenol), ibuprofen (Advil, Motrin), or naproxen (Aleve). Be safe with medicines. Read and follow all instructions on the label. No one younger than 20 should take aspirin. It has been linked to Reye syndrome, a serious illness.  Be careful when taking over-the-counter cold or flu medicines and Tylenol at the same time. Many of these medicines have acetaminophen, which is Tylenol. Read the labels to make sure that you are not taking more than the recommended dose. Too much acetaminophen (Tylenol) can be harmful.  Get plenty of rest.  Use saline (saltwater) nasal washes to help keep your nasal passages open and wash out mucus and allergens. You can buy saline nose sprays at a grocery store or drugstore. Follow the instructions on the package. Or you can make your own at home. Add 1 teaspoon of non-iodized salt and 1 teaspoon of baking soda to 2 cups of distilled or boiled and cooled water. Fill a squeeze bottle or neti pot with the nasal wash. Then put the tip into your nostril, and lean over the sink. With your mouth open, gently squirt the liquid. Repeat on the other side.  Use a vaporizer or humidifier to add moisture to your bedroom. Follow the instructions  "for cleaning the machine.  Do not smoke or allow others to smoke around you. If you need help quitting, talk to your doctor about stop-smoking programs and medicines. These can increase your chances of quitting for good.  When should you call for help?   Call 911 anytime you think you may need emergency care. For example, call if:    You have severe trouble breathing.   Call your doctor now or seek immediate medical care if:    You have a new or higher fever.     Your fever lasts more than 48 hours.     You have trouble breathing.     You have a fever with a stiff neck or a severe headache.     You are sensitive to light.     You feel very sleepy or confused.   Watch closely for changes in your health, and be sure to contact your doctor if:    You do not get better as expected.   Where can you learn more?  Go to https://www.[employer].net/patiented  Enter Q795 in the search box to learn more about \"Viral Respiratory Infection: Care Instructions.\"  Current as of: April 30, 2024  Content Version: 14.3    2024 [employer].   Care instructions adapted under license by your healthcare professional. If you have questions about a medical condition or this instruction, always ask your healthcare professional. [employer] disclaims any warranty or liability for your use of this information.    "

## (undated) DEVICE — BLADE KNIFE SURG 10 371110

## (undated) DEVICE — BLADE CLIPPER 4406

## (undated) DEVICE — SU VICRYL 0 TIE 12X18" J906G

## (undated) DEVICE — SU VICRYL 3-0 CT-1 36" J344H

## (undated) DEVICE — SUCTION TIP POOLE K770

## (undated) DEVICE — ESU LIGASURE IMPACT OPEN SEALER/DVDR CVD LG JAW LF4418

## (undated) DEVICE — BARRIER SEPRAFILM 5X6" SINGLE SHEET 4301-02

## (undated) DEVICE — GOWN IMPERVIOUS SPECIALTY XLG/XLONG 32474

## (undated) DEVICE — ESU ELEC BLADE 6" COATED E1450-6

## (undated) DEVICE — CATH URETERAL FLEX TIP TIGERTAIL 06FRX70CM 139006

## (undated) DEVICE — SU VICRYL 0 CT-2 27" J334H

## (undated) DEVICE — GLOVE PROTEXIS W/NEU-THERA 7.5  2D73TE75

## (undated) DEVICE — PREP CHLORAPREP 26ML TINTED ORANGE  260815

## (undated) DEVICE — LINEN TOWEL PACK X5 5464

## (undated) DEVICE — SU PDS II 0 CTX 60" Z990G

## (undated) DEVICE — PREP SKIN SCRUB TRAY 4461A

## (undated) DEVICE — SYR 10ML FINGER CONTROL W/O NDL 309695

## (undated) DEVICE — GLOVE PROTEXIS BLUE W/NEU-THERA 6.0  2D73EB60

## (undated) DEVICE — Device

## (undated) DEVICE — SU VICRYL 3-0 CT-1 36" J338H

## (undated) DEVICE — SU VICRYL 4-0 PS-2 18" UND J496H

## (undated) DEVICE — KIT ABDOMINAL HYST SMA15AHFSM

## (undated) DEVICE — STPL SKIN SUBCUTICULAR INSORB  2030

## (undated) DEVICE — PAD CHUX UNDERPAD 23X24" 7136

## (undated) DEVICE — DRAPE CV SPLIT II 147X106" 9158

## (undated) DEVICE — TUBING IRRIG CYSTO/BLADDER SET 81" LF 2C4040

## (undated) DEVICE — PEN MARKING SKIN

## (undated) DEVICE — GLOVE BIOGEL PI ULTRATOUCH G SZ 6.5 42165

## (undated) DEVICE — SU VICRYL 3-0 SH 27" J316H

## (undated) DEVICE — ESU GROUND PAD UNIVERSAL W/O CORD

## (undated) DEVICE — SYR 50ML LL W/O NDL 309653

## (undated) DEVICE — DRAPE MAYO STAND 23X54 8337

## (undated) DEVICE — ADH SKIN CLOSURE PREMIERPRO EXOFIN 1.0ML 3470

## (undated) DEVICE — GLOVE PROTEXIS MICRO 6.5  2D73PM65

## (undated) DEVICE — GLOVE PROTEXIS BLUE W/NEU-THERA 6.5  2D73EB65

## (undated) DEVICE — ESU LIGASURE ATLAS 20CM  LS1020

## (undated) DEVICE — SOL WATER IRRIG 3000ML BAG 2B7117

## (undated) DEVICE — SPONGE KITTNER 31001010

## (undated) DEVICE — CATH TRAY FOLEY SURESTEP 16FR W/URNE MTR STLK LATEX A303316A

## (undated) DEVICE — SU VICRYL 0 CT-1 27" UND J260H

## (undated) DEVICE — SUCTION CANISTER MEDIVAC LINER 3000ML W/LID 65651-530

## (undated) DEVICE — ESU ELEC BLADE NN-STCK PLUMEPEN PRO 360D 10FT PLPRO4020

## (undated) DEVICE — DRSG STERI STRIP 1/2X4" R1547

## (undated) DEVICE — DRAPE LAVH/LAPAROSCOPY W/POUCH 29474

## (undated) DEVICE — DRSG TELFA ISLAND 4X10"

## (undated) DEVICE — GLOVE PROTEXIS BLUE W/NEU-THERA 7.5  2D73EB75

## (undated) DEVICE — SU VICRYL 4-0 SH-1 27" J315H

## (undated) DEVICE — DRAPE IOBAN INCISE 23X17" 6650EZ

## (undated) DEVICE — CONNECTOR URETERAL CATH 14FR GOLDBERG 050049

## (undated) DEVICE — RX SURGIFLO HEMOSTATIC MATRIX W/THROMBIN 8ML 2994

## (undated) DEVICE — RETR ELEV / UTERINE MANIPULATOR V-CARE SM CUP 60-6085-200

## (undated) DEVICE — SPONGE LAP 18X18" X8435

## (undated) DEVICE — DRAPE SHEET REV FOLD 3/4 9349

## (undated) DEVICE — GOWN IMPERVIOUS SPECIALTY XL/XLONG 39049

## (undated) DEVICE — MANIFOLD NEPTUNE 4 PORT 700-20

## (undated) DEVICE — GLOVE PROTEXIS MICRO 6.0  2D73PM60

## (undated) DEVICE — GLOVE PROTEXIS MICRO 7.5  2D73PM75

## (undated) DEVICE — CATH TRAY FOLEY SURESTEP 16FR WDRAIN BAG STLK LATEX A300316A

## (undated) DEVICE — SU SILK 0 24" TIE SA76G

## (undated) DEVICE — SOL WATER IRRIG 1000ML BOTTLE 2F7114

## (undated) DEVICE — SOL NACL 0.9% IRRIG 1000ML BOTTLE 2F7124

## (undated) DEVICE — ESU ELEC BLADE 6" COATED/INSULATED E1455-6

## (undated) DEVICE — GLOVE PROTEXIS W/NEU-THERA 6.5  2D73TE65

## (undated) DEVICE — PACK CYSTOSCOPY SBA15CYFSI

## (undated) DEVICE — VESSEL LOOPS RED MAXI

## (undated) DEVICE — SU VICRYL 0 CT-1 CR 8X18" J740D

## (undated) DEVICE — DRAPE LEGGINGS 8421

## (undated) DEVICE — SPONGE RAY-TEC 4X4" 7317

## (undated) DEVICE — SU VICRYL 0 CT-1 27" J340H

## (undated) DEVICE — BAG DRAIN URO FOR SIEMENS 8MM ADAPTER NS CC164NS-A

## (undated) DEVICE — NDL 22GA 1.5"

## (undated) DEVICE — SU VICRYL 2-0 SH 27" J317H

## (undated) RX ORDER — VECURONIUM BROMIDE 1 MG/ML
INJECTION, POWDER, LYOPHILIZED, FOR SOLUTION INTRAVENOUS
Status: DISPENSED
Start: 2022-07-13

## (undated) RX ORDER — ONDANSETRON 2 MG/ML
INJECTION INTRAMUSCULAR; INTRAVENOUS
Status: DISPENSED
Start: 2022-01-06

## (undated) RX ORDER — PROPOFOL 10 MG/ML
INJECTION, EMULSION INTRAVENOUS
Status: DISPENSED
Start: 2022-01-06

## (undated) RX ORDER — GLYCOPYRROLATE 0.2 MG/ML
INJECTION, SOLUTION INTRAMUSCULAR; INTRAVENOUS
Status: DISPENSED
Start: 2022-01-06

## (undated) RX ORDER — ACETAMINOPHEN 325 MG/1
TABLET ORAL
Status: DISPENSED
Start: 2022-01-06

## (undated) RX ORDER — CEFAZOLIN SODIUM 2 G/100ML
INJECTION, SOLUTION INTRAVENOUS
Status: DISPENSED
Start: 2022-01-06

## (undated) RX ORDER — CEFAZOLIN SODIUM/WATER 2 G/20 ML
SYRINGE (ML) INTRAVENOUS
Status: DISPENSED
Start: 2022-07-13

## (undated) RX ORDER — PROPOFOL 10 MG/ML
INJECTION, EMULSION INTRAVENOUS
Status: DISPENSED
Start: 2022-07-13

## (undated) RX ORDER — KETOROLAC TROMETHAMINE 30 MG/ML
INJECTION, SOLUTION INTRAMUSCULAR; INTRAVENOUS
Status: DISPENSED
Start: 2022-01-06

## (undated) RX ORDER — FENTANYL CITRATE 50 UG/ML
INJECTION, SOLUTION INTRAMUSCULAR; INTRAVENOUS
Status: DISPENSED
Start: 2022-07-13

## (undated) RX ORDER — ACETAMINOPHEN 325 MG/1
TABLET ORAL
Status: DISPENSED
Start: 2022-07-13

## (undated) RX ORDER — FENTANYL CITRATE 0.05 MG/ML
INJECTION, SOLUTION INTRAMUSCULAR; INTRAVENOUS
Status: DISPENSED
Start: 2022-01-06

## (undated) RX ORDER — BUPIVACAINE HYDROCHLORIDE AND EPINEPHRINE 5; 5 MG/ML; UG/ML
INJECTION, SOLUTION EPIDURAL; INTRACAUDAL; PERINEURAL
Status: DISPENSED
Start: 2022-01-06

## (undated) RX ORDER — FENTANYL CITRATE 50 UG/ML
INJECTION, SOLUTION INTRAMUSCULAR; INTRAVENOUS
Status: DISPENSED
Start: 2022-01-06

## (undated) RX ORDER — BUPIVACAINE HYDROCHLORIDE AND EPINEPHRINE 5; 5 MG/ML; UG/ML
INJECTION, SOLUTION EPIDURAL; INTRACAUDAL; PERINEURAL
Status: DISPENSED
Start: 2022-07-13

## (undated) RX ORDER — HYDROMORPHONE HYDROCHLORIDE 1 MG/ML
INJECTION, SOLUTION INTRAMUSCULAR; INTRAVENOUS; SUBCUTANEOUS
Status: DISPENSED
Start: 2022-07-13